# Patient Record
Sex: FEMALE | Race: WHITE | NOT HISPANIC OR LATINO
[De-identification: names, ages, dates, MRNs, and addresses within clinical notes are randomized per-mention and may not be internally consistent; named-entity substitution may affect disease eponyms.]

---

## 2023-09-07 ENCOUNTER — APPOINTMENT (OUTPATIENT)
Dept: INTERNAL MEDICINE | Facility: CLINIC | Age: 43
End: 2023-09-07

## 2023-10-24 PROBLEM — Z00.00 ENCOUNTER FOR PREVENTIVE HEALTH EXAMINATION: Status: ACTIVE | Noted: 2023-10-24

## 2023-10-26 ENCOUNTER — APPOINTMENT (OUTPATIENT)
Dept: OBGYN | Facility: CLINIC | Age: 43
End: 2023-10-26
Payer: COMMERCIAL

## 2023-10-26 VITALS
WEIGHT: 147 LBS | DIASTOLIC BLOOD PRESSURE: 82 MMHG | BODY MASS INDEX: 23.63 KG/M2 | SYSTOLIC BLOOD PRESSURE: 116 MMHG | HEIGHT: 66 IN

## 2023-10-26 DIAGNOSIS — Z80.3 FAMILY HISTORY OF MALIGNANT NEOPLASM OF BREAST: ICD-10-CM

## 2023-10-26 DIAGNOSIS — B37.9 CANDIDIASIS, UNSPECIFIED: ICD-10-CM

## 2023-10-26 DIAGNOSIS — Z01.419 ENCOUNTER FOR GYNECOLOGICAL EXAMINATION (GENERAL) (ROUTINE) W/OUT ABNORMAL FINDINGS: ICD-10-CM

## 2023-10-26 DIAGNOSIS — N97.9 FEMALE INFERTILITY, UNSPECIFIED: ICD-10-CM

## 2023-10-26 PROCEDURE — 99386 PREV VISIT NEW AGE 40-64: CPT

## 2023-10-27 LAB — HPV HIGH+LOW RISK DNA PNL CVX: NOT DETECTED

## 2023-10-28 ENCOUNTER — TRANSCRIPTION ENCOUNTER (OUTPATIENT)
Age: 43
End: 2023-10-28

## 2023-10-31 ENCOUNTER — TRANSCRIPTION ENCOUNTER (OUTPATIENT)
Age: 43
End: 2023-10-31

## 2023-11-01 LAB — CYTOLOGY CVX/VAG DOC THIN PREP: ABNORMAL

## 2023-11-02 ENCOUNTER — TRANSCRIPTION ENCOUNTER (OUTPATIENT)
Age: 43
End: 2023-11-02

## 2023-11-04 ENCOUNTER — RESULT REVIEW (OUTPATIENT)
Age: 43
End: 2023-11-04

## 2023-11-04 ENCOUNTER — OUTPATIENT (OUTPATIENT)
Dept: OUTPATIENT SERVICES | Facility: HOSPITAL | Age: 43
LOS: 1 days | End: 2023-11-04
Payer: COMMERCIAL

## 2023-11-04 ENCOUNTER — APPOINTMENT (OUTPATIENT)
Dept: MAMMOGRAPHY | Facility: IMAGING CENTER | Age: 43
End: 2023-11-04
Payer: COMMERCIAL

## 2023-11-04 DIAGNOSIS — Z01.419 ENCOUNTER FOR GYNECOLOGICAL EXAMINATION (GENERAL) (ROUTINE) WITHOUT ABNORMAL FINDINGS: ICD-10-CM

## 2023-11-04 PROCEDURE — 77063 BREAST TOMOSYNTHESIS BI: CPT | Mod: 26

## 2023-11-04 PROCEDURE — 77067 SCR MAMMO BI INCL CAD: CPT

## 2023-11-04 PROCEDURE — 77067 SCR MAMMO BI INCL CAD: CPT | Mod: 26

## 2023-11-04 PROCEDURE — 77063 BREAST TOMOSYNTHESIS BI: CPT

## 2024-01-02 ENCOUNTER — TRANSCRIPTION ENCOUNTER (OUTPATIENT)
Age: 44
End: 2024-01-02

## 2024-01-03 ENCOUNTER — APPOINTMENT (OUTPATIENT)
Dept: OBGYN | Facility: CLINIC | Age: 44
End: 2024-01-03
Payer: COMMERCIAL

## 2024-01-03 VITALS — SYSTOLIC BLOOD PRESSURE: 122 MMHG | BODY MASS INDEX: 24.05 KG/M2 | DIASTOLIC BLOOD PRESSURE: 84 MMHG | WEIGHT: 149 LBS

## 2024-01-03 DIAGNOSIS — N92.6 IRREGULAR MENSTRUATION, UNSPECIFIED: ICD-10-CM

## 2024-01-03 PROCEDURE — 99214 OFFICE O/P EST MOD 30 MIN: CPT | Mod: 25

## 2024-01-03 PROCEDURE — 76817 TRANSVAGINAL US OBSTETRIC: CPT

## 2024-01-03 NOTE — PROCEDURE
[Transvaginal OB Sonogram] : Transvaginal OB Sonogram [Transabdominal OB Sonogram] : Transabdominal OB Sonogram [Intrauterine Pregnancy] : intrauterine pregnancy [Yolk Sac] : yolk sac present [Fetal Heart] : fetal heart present [Date: ___] : Date: [unfilled] [Current GA by Sonogram: ___ (wks)] : Current GA by Sonogram: [unfilled]Uwks [___ day(s)] : [unfilled] days [FreeTextEntry1] : SIUP c/w 9+0, FHT 180s

## 2024-01-03 NOTE — HISTORY OF PRESENT ILLNESS
[FreeTextEntry1] : LMP 10/21  42yo  with amenorrhea and +UPT owuld be 10+4 by LMP. Here with  Renaldo today. Hx of infertility s/p IVF (IVF pregnancy last pregnancy) and hx of L ectopic s/p L salpingectomy and possible endometriosis. Was told other fallopian tube was blocked on HSG. Also hx FVL heterozygote dx during IVF workup, no personal hx clots. 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  POB: L ectopic s/p salpingectomy, FT   Adin 39 week IOL PROM 8#5 (IVF pregnancy, on lovenox thorughout pregnancy)

## 2024-01-03 NOTE — PLAN
[FreeTextEntry1] : Redated to 9wk by sonia today, VERA 8/7/24. Discussed FVL and given pt was on lovenox jhoana last pregnancy (no personal hx) recommend restarting lovenox. FOr MFM consult. S/p flu vax, discusesd covid vax. Discussed option of NIPT today and discussed option doing at later GA given risk of low fetal fraction, pt wishes to do NIPT today with PNL. Advised ASA 1-2 tabs daily. RTO 3 wk, USC given.

## 2024-01-04 LAB
ABO + RH PNL BLD: NORMAL
BASOPHILS # BLD AUTO: 0.07 K/UL
BASOPHILS NFR BLD AUTO: 0.9 %
BLD GP AB SCN SERPL QL: NORMAL
C TRACH RRNA SPEC QL NAA+PROBE: NOT DETECTED
EOSINOPHIL # BLD AUTO: 0.17 K/UL
EOSINOPHIL NFR BLD AUTO: 2.3 %
HBV SURFACE AG SER QL: NONREACTIVE
HCT VFR BLD CALC: 39.2 %
HCV AB SER QL: NONREACTIVE
HCV S/CO RATIO: 0.17 S/CO
HGB A MFR BLD: 97.4 %
HGB A2 MFR BLD: 2.6 %
HGB BLD-MCNC: 12.8 G/DL
HGB FRACT BLD-IMP: NORMAL
HIV1+2 AB SPEC QL IA.RAPID: NONREACTIVE
IMM GRANULOCYTES NFR BLD AUTO: 0.3 %
LYMPHOCYTES # BLD AUTO: 2.65 K/UL
LYMPHOCYTES NFR BLD AUTO: 36 %
MAN DIFF?: NORMAL
MCHC RBC-ENTMCNC: 29.8 PG
MCHC RBC-ENTMCNC: 32.7 GM/DL
MCV RBC AUTO: 91.4 FL
MEV IGG FLD QL IA: >300 AU/ML
MEV IGG+IGM SER-IMP: POSITIVE
MONOCYTES # BLD AUTO: 0.39 K/UL
MONOCYTES NFR BLD AUTO: 5.3 %
N GONORRHOEA RRNA SPEC QL NAA+PROBE: NOT DETECTED
NEUTROPHILS # BLD AUTO: 4.07 K/UL
NEUTROPHILS NFR BLD AUTO: 55.2 %
PLATELET # BLD AUTO: 275 K/UL
RBC # BLD: 4.29 M/UL
RBC # FLD: 13.7 %
RUBV IGG FLD-ACNC: 2.9 INDEX
RUBV IGG SER-IMP: POSITIVE
SOURCE AMPLIFICATION: NORMAL
T PALLIDUM AB SER QL IA: NEGATIVE
TSH SERPL-ACNC: 1.16 UIU/ML
VZV AB TITR SER: POSITIVE
VZV IGG SER IF-ACNC: 999.4 INDEX
WBC # FLD AUTO: 7.37 K/UL

## 2024-01-04 RX ORDER — ENOXAPARIN SODIUM 40 MG/.4ML
40 INJECTION, SOLUTION SUBCUTANEOUS DAILY
Qty: 4 | Refills: 9 | Status: ACTIVE | COMMUNITY
Start: 2024-01-03 | End: 1900-01-01

## 2024-01-05 LAB — BACTERIA UR CULT: NORMAL

## 2024-01-07 LAB
AR GENE MUT ANL BLD/T: NORMAL
LEAD BLD-MCNC: <1 UG/DL

## 2024-01-09 LAB — FMR1 GENE MUT ANL BLD/T: NORMAL

## 2024-01-10 LAB — CFTR MUT TESTED BLD/T: NEGATIVE

## 2024-01-22 ENCOUNTER — APPOINTMENT (OUTPATIENT)
Dept: OBGYN | Facility: CLINIC | Age: 44
End: 2024-01-22
Payer: COMMERCIAL

## 2024-01-22 ENCOUNTER — ASOB RESULT (OUTPATIENT)
Age: 44
End: 2024-01-22

## 2024-01-22 ENCOUNTER — APPOINTMENT (OUTPATIENT)
Dept: ANTEPARTUM | Facility: CLINIC | Age: 44
End: 2024-01-22
Payer: COMMERCIAL

## 2024-01-22 ENCOUNTER — NON-APPOINTMENT (OUTPATIENT)
Age: 44
End: 2024-01-22

## 2024-01-22 VITALS
BODY MASS INDEX: 23.87 KG/M2 | DIASTOLIC BLOOD PRESSURE: 74 MMHG | SYSTOLIC BLOOD PRESSURE: 110 MMHG | WEIGHT: 148.5 LBS | HEIGHT: 66 IN

## 2024-01-22 VITALS
WEIGHT: 143 LBS | SYSTOLIC BLOOD PRESSURE: 100 MMHG | BODY MASS INDEX: 22.98 KG/M2 | DIASTOLIC BLOOD PRESSURE: 60 MMHG | HEIGHT: 66 IN

## 2024-01-22 DIAGNOSIS — D68.51 ACTIVATED PROTEIN C RESISTANCE: ICD-10-CM

## 2024-01-22 DIAGNOSIS — Z34.91 ENCOUNTER FOR SUPERVISION OF NORMAL PREGNANCY, UNSPECIFIED, FIRST TRIMESTER: ICD-10-CM

## 2024-01-22 PROCEDURE — 76817 TRANSVAGINAL US OBSTETRIC: CPT

## 2024-01-22 PROCEDURE — 76813 OB US NUCHAL MEAS 1 GEST: CPT | Mod: 59

## 2024-01-22 PROCEDURE — 76801 OB US < 14 WKS SINGLE FETUS: CPT

## 2024-01-22 PROCEDURE — 0501F PRENATAL FLOW SHEET: CPT

## 2024-01-24 LAB — DNA PLOIDY SPEC FC-IMP: NORMAL

## 2024-02-15 ENCOUNTER — NON-APPOINTMENT (OUTPATIENT)
Age: 44
End: 2024-02-15

## 2024-02-15 ENCOUNTER — APPOINTMENT (OUTPATIENT)
Dept: OBGYN | Facility: CLINIC | Age: 44
End: 2024-02-15
Payer: COMMERCIAL

## 2024-02-15 VITALS — WEIGHT: 150 LBS | SYSTOLIC BLOOD PRESSURE: 120 MMHG | BODY MASS INDEX: 24.21 KG/M2 | DIASTOLIC BLOOD PRESSURE: 70 MMHG

## 2024-02-15 DIAGNOSIS — Z34.90 ENCOUNTER FOR SUPERVISION OF NORMAL PREGNANCY, UNSPECIFIED, UNSPECIFIED TRIMESTER: ICD-10-CM

## 2024-02-15 DIAGNOSIS — M54.50 LOW BACK PAIN, UNSPECIFIED: ICD-10-CM

## 2024-02-15 PROCEDURE — 99213 OFFICE O/P EST LOW 20 MIN: CPT | Mod: 25

## 2024-02-15 PROCEDURE — 76817 TRANSVAGINAL US OBSTETRIC: CPT

## 2024-02-15 RX ORDER — FLUCONAZOLE 150 MG/1
150 TABLET ORAL
Qty: 1 | Refills: 0 | Status: COMPLETED | COMMUNITY
Start: 2023-10-26 | End: 2024-02-15

## 2024-02-15 NOTE — REVIEW OF SYSTEMS
[Chest Pain] : no chest pain [Dyspnea] : no shortness of breath [Dysuria] : no dysuria [Abdominal Pain] : no abdominal pain [Pelvic Pain] : no pelvic pain [Abn Vag Bleeding] : no abnormal vaginal bleeding [Urgency] : no urgency [Nl] : Integumentary

## 2024-02-15 NOTE — PHYSICAL EXAM
[Chaperone Declined] : Patient declined chaperone [Normal] : uterus [No Bleeding] : there was no active vaginal bleeding [Tenderness] : nontender [FreeTextEntry5] : closed

## 2024-02-15 NOTE — PROCEDURE
[Transvaginal Ultrasound] : transvaginal ultrasound [FreeTextEntry3] : cervical length: 3.34 (wnl) see pic

## 2024-02-20 ENCOUNTER — TRANSCRIPTION ENCOUNTER (OUTPATIENT)
Age: 44
End: 2024-02-20

## 2024-02-20 ENCOUNTER — NON-APPOINTMENT (OUTPATIENT)
Age: 44
End: 2024-02-20

## 2024-02-21 ENCOUNTER — TRANSCRIPTION ENCOUNTER (OUTPATIENT)
Age: 44
End: 2024-02-21

## 2024-02-21 ENCOUNTER — APPOINTMENT (OUTPATIENT)
Dept: ANTEPARTUM | Facility: CLINIC | Age: 44
End: 2024-02-21

## 2024-02-23 ENCOUNTER — APPOINTMENT (OUTPATIENT)
Dept: OBGYN | Facility: CLINIC | Age: 44
End: 2024-02-23
Payer: COMMERCIAL

## 2024-02-23 PROCEDURE — 0502F SUBSEQUENT PRENATAL CARE: CPT

## 2024-02-27 LAB
AFP MOM: 1.08
AFP VALUE: 37.6 NG/ML
ALPHA FETOPROTEIN SERUM COMMENT: NORMAL
ALPHA FETOPROTEIN SERUM INTERPRETATION: NORMAL
ALPHA FETOPROTEIN SERUM RESULTS: NORMAL
ALPHA FETOPROTEIN SERUM TEST RESULTS: NORMAL
GESTATIONAL AGE BASED ON: NORMAL
GESTATIONAL AGE ON COLLECTION DATE: 16.3 WEEKS
INSULIN DEP DIABETES: NO
MATERNAL AGE AT EDD AFP: 45.6 YR
MULTIPLE GESTATION: NO
OSBR RISK 1 IN: 9540
RACE: NORMAL
WEIGHT AFP: 152 LBS

## 2024-02-29 ENCOUNTER — ASOB RESULT (OUTPATIENT)
Age: 44
End: 2024-02-29

## 2024-02-29 ENCOUNTER — APPOINTMENT (OUTPATIENT)
Dept: MATERNAL FETAL MEDICINE | Facility: CLINIC | Age: 44
End: 2024-02-29
Payer: COMMERCIAL

## 2024-02-29 ENCOUNTER — APPOINTMENT (OUTPATIENT)
Dept: ANTEPARTUM | Facility: CLINIC | Age: 44
End: 2024-02-29
Payer: COMMERCIAL

## 2024-02-29 VITALS
HEIGHT: 66 IN | HEART RATE: 97 BPM | BODY MASS INDEX: 25.09 KG/M2 | SYSTOLIC BLOOD PRESSURE: 120 MMHG | DIASTOLIC BLOOD PRESSURE: 67 MMHG | OXYGEN SATURATION: 98 % | WEIGHT: 156.13 LBS

## 2024-02-29 PROCEDURE — 76805 OB US >/= 14 WKS SNGL FETUS: CPT

## 2024-02-29 PROCEDURE — 76817 TRANSVAGINAL US OBSTETRIC: CPT

## 2024-02-29 PROCEDURE — 99205 OFFICE O/P NEW HI 60 MIN: CPT

## 2024-03-03 ENCOUNTER — NON-APPOINTMENT (OUTPATIENT)
Age: 44
End: 2024-03-03

## 2024-03-05 ENCOUNTER — ASOB RESULT (OUTPATIENT)
Age: 44
End: 2024-03-05

## 2024-03-05 ENCOUNTER — APPOINTMENT (OUTPATIENT)
Dept: MATERNAL FETAL MEDICINE | Facility: CLINIC | Age: 44
End: 2024-03-05
Payer: COMMERCIAL

## 2024-03-05 PROCEDURE — 99204 OFFICE O/P NEW MOD 45 MIN: CPT | Mod: 95

## 2024-03-05 PROCEDURE — 99214 OFFICE O/P EST MOD 30 MIN: CPT | Mod: 95

## 2024-03-11 ENCOUNTER — NON-APPOINTMENT (OUTPATIENT)
Age: 44
End: 2024-03-11

## 2024-03-19 ENCOUNTER — APPOINTMENT (OUTPATIENT)
Dept: OBGYN | Facility: CLINIC | Age: 44
End: 2024-03-19
Payer: COMMERCIAL

## 2024-03-19 VITALS
DIASTOLIC BLOOD PRESSURE: 72 MMHG | BODY MASS INDEX: 26.06 KG/M2 | WEIGHT: 162.13 LBS | HEIGHT: 66 IN | SYSTOLIC BLOOD PRESSURE: 106 MMHG

## 2024-03-19 DIAGNOSIS — N64.59 OTHER SIGNS AND SYMPTOMS IN BREAST: ICD-10-CM

## 2024-03-19 LAB
ESTIMATED AVERAGE GLUCOSE: 94 MG/DL
HBA1C MFR BLD HPLC: 4.9 %

## 2024-03-19 PROCEDURE — 0502F SUBSEQUENT PRENATAL CARE: CPT

## 2024-03-20 ENCOUNTER — ASOB RESULT (OUTPATIENT)
Age: 44
End: 2024-03-20

## 2024-03-20 ENCOUNTER — APPOINTMENT (OUTPATIENT)
Dept: ANTEPARTUM | Facility: CLINIC | Age: 44
End: 2024-03-20
Payer: COMMERCIAL

## 2024-03-20 DIAGNOSIS — O09.529 SUPERVISION OF ELDERLY MULTIGRAVIDA, UNSPECIFIED TRIMESTER: ICD-10-CM

## 2024-03-20 LAB
ALBUMIN SERPL ELPH-MCNC: 3.7 G/DL
ALP BLD-CCNC: 61 U/L
ALT SERPL-CCNC: 23 U/L
ANION GAP SERPL CALC-SCNC: 12 MMOL/L
AST SERPL-CCNC: 17 U/L
BILIRUB SERPL-MCNC: 0.2 MG/DL
BUN SERPL-MCNC: 7 MG/DL
CALCIUM SERPL-MCNC: 9.7 MG/DL
CHLORIDE SERPL-SCNC: 106 MMOL/L
CO2 SERPL-SCNC: 21 MMOL/L
CREAT SERPL-MCNC: 0.47 MG/DL
EGFR: 120 ML/MIN/1.73M2
GLUCOSE SERPL-MCNC: 84 MG/DL
POTASSIUM SERPL-SCNC: 5.2 MMOL/L
PROT SERPL-MCNC: 6.5 G/DL
SODIUM SERPL-SCNC: 139 MMOL/L

## 2024-03-20 PROCEDURE — 76817 TRANSVAGINAL US OBSTETRIC: CPT | Mod: 59

## 2024-03-20 PROCEDURE — 76811 OB US DETAILED SNGL FETUS: CPT

## 2024-03-21 ENCOUNTER — TRANSCRIPTION ENCOUNTER (OUTPATIENT)
Age: 44
End: 2024-03-21

## 2024-03-27 ENCOUNTER — APPOINTMENT (OUTPATIENT)
Dept: PEDIATRIC CARDIOLOGY | Facility: CLINIC | Age: 44
End: 2024-03-27
Payer: COMMERCIAL

## 2024-03-27 PROCEDURE — 93325 DOPPLER ECHO COLOR FLOW MAPG: CPT

## 2024-03-27 PROCEDURE — 99202 OFFICE O/P NEW SF 15 MIN: CPT | Mod: 25

## 2024-03-27 PROCEDURE — 76825 ECHO EXAM OF FETAL HEART: CPT

## 2024-03-27 PROCEDURE — 76827 ECHO EXAM OF FETAL HEART: CPT

## 2024-04-04 ENCOUNTER — APPOINTMENT (OUTPATIENT)
Dept: ANTEPARTUM | Facility: CLINIC | Age: 44
End: 2024-04-04

## 2024-04-11 ENCOUNTER — ASOB RESULT (OUTPATIENT)
Age: 44
End: 2024-04-11

## 2024-04-11 ENCOUNTER — APPOINTMENT (OUTPATIENT)
Dept: ANTEPARTUM | Facility: CLINIC | Age: 44
End: 2024-04-11
Payer: COMMERCIAL

## 2024-04-11 PROCEDURE — 76816 OB US FOLLOW-UP PER FETUS: CPT

## 2024-04-11 PROCEDURE — 76817 TRANSVAGINAL US OBSTETRIC: CPT

## 2024-04-12 ENCOUNTER — NON-APPOINTMENT (OUTPATIENT)
Age: 44
End: 2024-04-12

## 2024-04-17 ENCOUNTER — APPOINTMENT (OUTPATIENT)
Dept: OBGYN | Facility: CLINIC | Age: 44
End: 2024-04-17
Payer: COMMERCIAL

## 2024-04-17 VITALS
WEIGHT: 168 LBS | SYSTOLIC BLOOD PRESSURE: 97 MMHG | DIASTOLIC BLOOD PRESSURE: 65 MMHG | BODY MASS INDEX: 27 KG/M2 | HEIGHT: 66 IN

## 2024-04-17 DIAGNOSIS — Z34.92 ENCOUNTER FOR SUPERVISION OF NORMAL PREGNANCY, UNSPECIFIED, SECOND TRIMESTER: ICD-10-CM

## 2024-04-17 PROCEDURE — 0502F SUBSEQUENT PRENATAL CARE: CPT

## 2024-04-18 ENCOUNTER — TRANSCRIPTION ENCOUNTER (OUTPATIENT)
Age: 44
End: 2024-04-18

## 2024-04-18 LAB
BASOPHILS # BLD AUTO: 0.07 K/UL
BASOPHILS NFR BLD AUTO: 0.8 %
EOSINOPHIL # BLD AUTO: 0.18 K/UL
EOSINOPHIL NFR BLD AUTO: 2 %
GLUCOSE 1H P 50 G GLC PO SERPL-MCNC: 93 MG/DL
HCT VFR BLD CALC: 33.9 %
HGB BLD-MCNC: 10.8 G/DL
HIV1+2 AB SPEC QL IA.RAPID: NONREACTIVE
IMM GRANULOCYTES NFR BLD AUTO: 0.4 %
LYMPHOCYTES # BLD AUTO: 2.49 K/UL
LYMPHOCYTES NFR BLD AUTO: 27.6 %
MAN DIFF?: NORMAL
MCHC RBC-ENTMCNC: 29.7 PG
MCHC RBC-ENTMCNC: 31.9 GM/DL
MCV RBC AUTO: 93.1 FL
MONOCYTES # BLD AUTO: 0.49 K/UL
MONOCYTES NFR BLD AUTO: 5.4 %
NEUTROPHILS # BLD AUTO: 5.76 K/UL
NEUTROPHILS NFR BLD AUTO: 63.8 %
PLATELET # BLD AUTO: 306 K/UL
RBC # BLD: 3.64 M/UL
RBC # FLD: 14.6 %
T PALLIDUM AB SER QL IA: NEGATIVE
WBC # FLD AUTO: 9.03 K/UL

## 2024-04-25 ENCOUNTER — APPOINTMENT (OUTPATIENT)
Dept: ANTEPARTUM | Facility: CLINIC | Age: 44
End: 2024-04-25
Payer: COMMERCIAL

## 2024-04-25 ENCOUNTER — ASOB RESULT (OUTPATIENT)
Age: 44
End: 2024-04-25

## 2024-04-25 PROCEDURE — 76816 OB US FOLLOW-UP PER FETUS: CPT

## 2024-05-06 ENCOUNTER — NON-APPOINTMENT (OUTPATIENT)
Age: 44
End: 2024-05-06

## 2024-05-09 ENCOUNTER — APPOINTMENT (OUTPATIENT)
Dept: ANTEPARTUM | Facility: CLINIC | Age: 44
End: 2024-05-09
Payer: COMMERCIAL

## 2024-05-09 ENCOUNTER — ASOB RESULT (OUTPATIENT)
Age: 44
End: 2024-05-09

## 2024-05-09 PROCEDURE — 76816 OB US FOLLOW-UP PER FETUS: CPT

## 2024-05-13 ENCOUNTER — NON-APPOINTMENT (OUTPATIENT)
Age: 44
End: 2024-05-13

## 2024-05-15 ENCOUNTER — APPOINTMENT (OUTPATIENT)
Dept: OBGYN | Facility: CLINIC | Age: 44
End: 2024-05-15
Payer: COMMERCIAL

## 2024-05-15 VITALS
BODY MASS INDEX: 27.48 KG/M2 | WEIGHT: 171 LBS | SYSTOLIC BLOOD PRESSURE: 98 MMHG | HEIGHT: 66 IN | DIASTOLIC BLOOD PRESSURE: 62 MMHG

## 2024-05-15 DIAGNOSIS — Z34.93 ENCOUNTER FOR SUPERVISION OF NORMAL PREGNANCY, UNSPECIFIED, THIRD TRIMESTER: ICD-10-CM

## 2024-05-15 DIAGNOSIS — D64.9 ANEMIA, UNSPECIFIED: ICD-10-CM

## 2024-05-15 PROCEDURE — 0502F SUBSEQUENT PRENATAL CARE: CPT

## 2024-05-15 RX ORDER — FERROUS SULFATE 137(45) MG
45 TABLET, EXTENDED RELEASE ORAL DAILY
Qty: 90 | Refills: 2 | Status: ACTIVE | COMMUNITY
Start: 2024-05-15 | End: 1900-01-01

## 2024-05-20 ENCOUNTER — NON-APPOINTMENT (OUTPATIENT)
Age: 44
End: 2024-05-20

## 2024-05-23 ENCOUNTER — ASOB RESULT (OUTPATIENT)
Age: 44
End: 2024-05-23

## 2024-05-23 ENCOUNTER — APPOINTMENT (OUTPATIENT)
Dept: ANTEPARTUM | Facility: CLINIC | Age: 44
End: 2024-05-23
Payer: COMMERCIAL

## 2024-05-23 PROCEDURE — 76816 OB US FOLLOW-UP PER FETUS: CPT

## 2024-05-30 ENCOUNTER — NON-APPOINTMENT (OUTPATIENT)
Age: 44
End: 2024-05-30

## 2024-05-30 ENCOUNTER — APPOINTMENT (OUTPATIENT)
Dept: OBGYN | Facility: CLINIC | Age: 44
End: 2024-05-30
Payer: COMMERCIAL

## 2024-05-30 VITALS — WEIGHT: 175 LBS | DIASTOLIC BLOOD PRESSURE: 62 MMHG | SYSTOLIC BLOOD PRESSURE: 94 MMHG | BODY MASS INDEX: 28.25 KG/M2

## 2024-05-30 PROCEDURE — 0502F SUBSEQUENT PRENATAL CARE: CPT

## 2024-06-05 ENCOUNTER — APPOINTMENT (OUTPATIENT)
Dept: ANTEPARTUM | Facility: CLINIC | Age: 44
End: 2024-06-05
Payer: COMMERCIAL

## 2024-06-05 ENCOUNTER — ASOB RESULT (OUTPATIENT)
Age: 44
End: 2024-06-05

## 2024-06-05 PROCEDURE — 76819 FETAL BIOPHYS PROFIL W/O NST: CPT | Mod: 59

## 2024-06-05 PROCEDURE — 76816 OB US FOLLOW-UP PER FETUS: CPT

## 2024-06-13 ENCOUNTER — NON-APPOINTMENT (OUTPATIENT)
Age: 44
End: 2024-06-13

## 2024-06-13 ENCOUNTER — APPOINTMENT (OUTPATIENT)
Dept: OBGYN | Facility: CLINIC | Age: 44
End: 2024-06-13
Payer: COMMERCIAL

## 2024-06-13 VITALS
DIASTOLIC BLOOD PRESSURE: 74 MMHG | WEIGHT: 178.25 LBS | BODY MASS INDEX: 28.65 KG/M2 | HEIGHT: 66 IN | SYSTOLIC BLOOD PRESSURE: 109 MMHG

## 2024-06-13 PROCEDURE — 81003 URINALYSIS AUTO W/O SCOPE: CPT | Mod: NC,QW

## 2024-06-13 PROCEDURE — 0502F SUBSEQUENT PRENATAL CARE: CPT

## 2024-06-18 ENCOUNTER — TRANSCRIPTION ENCOUNTER (OUTPATIENT)
Age: 44
End: 2024-06-18

## 2024-06-27 ENCOUNTER — NON-APPOINTMENT (OUTPATIENT)
Age: 44
End: 2024-06-27

## 2024-06-27 ENCOUNTER — ASOB RESULT (OUTPATIENT)
Age: 44
End: 2024-06-27

## 2024-06-27 ENCOUNTER — APPOINTMENT (OUTPATIENT)
Dept: ANTEPARTUM | Facility: CLINIC | Age: 44
End: 2024-06-27

## 2024-06-27 ENCOUNTER — APPOINTMENT (OUTPATIENT)
Dept: OBGYN | Facility: CLINIC | Age: 44
End: 2024-06-27
Payer: COMMERCIAL

## 2024-06-27 VITALS
BODY MASS INDEX: 28.51 KG/M2 | DIASTOLIC BLOOD PRESSURE: 70 MMHG | HEIGHT: 66 IN | SYSTOLIC BLOOD PRESSURE: 112 MMHG | WEIGHT: 177.38 LBS

## 2024-06-27 PROCEDURE — 0502F SUBSEQUENT PRENATAL CARE: CPT

## 2024-06-27 PROCEDURE — 76816 OB US FOLLOW-UP PER FETUS: CPT

## 2024-07-03 ENCOUNTER — APPOINTMENT (OUTPATIENT)
Dept: ANTEPARTUM | Facility: CLINIC | Age: 44
End: 2024-07-03

## 2024-07-09 ENCOUNTER — APPOINTMENT (OUTPATIENT)
Dept: ANTEPARTUM | Facility: CLINIC | Age: 44
End: 2024-07-09
Payer: COMMERCIAL

## 2024-07-09 ENCOUNTER — ASOB RESULT (OUTPATIENT)
Age: 44
End: 2024-07-09

## 2024-07-09 ENCOUNTER — APPOINTMENT (OUTPATIENT)
Dept: OBGYN | Facility: CLINIC | Age: 44
End: 2024-07-09
Payer: COMMERCIAL

## 2024-07-09 VITALS
BODY MASS INDEX: 29.25 KG/M2 | DIASTOLIC BLOOD PRESSURE: 76 MMHG | SYSTOLIC BLOOD PRESSURE: 117 MMHG | WEIGHT: 181.25 LBS

## 2024-07-09 PROCEDURE — 76818 FETAL BIOPHYS PROFILE W/NST: CPT

## 2024-07-09 PROCEDURE — 0502F SUBSEQUENT PRENATAL CARE: CPT

## 2024-07-09 PROCEDURE — 81003 URINALYSIS AUTO W/O SCOPE: CPT | Mod: NC,QW

## 2024-07-11 ENCOUNTER — APPOINTMENT (OUTPATIENT)
Dept: OBGYN | Facility: CLINIC | Age: 44
End: 2024-07-11

## 2024-07-11 ENCOUNTER — NON-APPOINTMENT (OUTPATIENT)
Age: 44
End: 2024-07-11

## 2024-07-18 ENCOUNTER — APPOINTMENT (OUTPATIENT)
Dept: OBGYN | Facility: CLINIC | Age: 44
End: 2024-07-18
Payer: COMMERCIAL

## 2024-07-18 ENCOUNTER — APPOINTMENT (OUTPATIENT)
Dept: ANTEPARTUM | Facility: CLINIC | Age: 44
End: 2024-07-18

## 2024-07-18 VITALS
DIASTOLIC BLOOD PRESSURE: 77 MMHG | SYSTOLIC BLOOD PRESSURE: 115 MMHG | BODY MASS INDEX: 29.81 KG/M2 | WEIGHT: 185.5 LBS | HEIGHT: 66 IN

## 2024-07-18 VITALS
BODY MASS INDEX: 29.81 KG/M2 | DIASTOLIC BLOOD PRESSURE: 77 MMHG | HEIGHT: 66 IN | SYSTOLIC BLOOD PRESSURE: 115 MMHG | WEIGHT: 185.5 LBS

## 2024-07-18 DIAGNOSIS — Z34.91 ENCOUNTER FOR SUPERVISION OF NORMAL PREGNANCY, UNSPECIFIED, FIRST TRIMESTER: ICD-10-CM

## 2024-07-18 DIAGNOSIS — Z34.92 ENCOUNTER FOR SUPERVISION OF NORMAL PREGNANCY, UNSPECIFIED, SECOND TRIMESTER: ICD-10-CM

## 2024-07-18 PROCEDURE — 0502F SUBSEQUENT PRENATAL CARE: CPT

## 2024-07-19 ENCOUNTER — APPOINTMENT (OUTPATIENT)
Dept: ANTEPARTUM | Facility: CLINIC | Age: 44
End: 2024-07-19

## 2024-07-19 ENCOUNTER — APPOINTMENT (OUTPATIENT)
Dept: ANTEPARTUM | Facility: CLINIC | Age: 44
End: 2024-07-19
Payer: COMMERCIAL

## 2024-07-19 ENCOUNTER — ASOB RESULT (OUTPATIENT)
Age: 44
End: 2024-07-19

## 2024-07-19 PROCEDURE — 76818 FETAL BIOPHYS PROFILE W/NST: CPT

## 2024-07-22 ENCOUNTER — NON-APPOINTMENT (OUTPATIENT)
Age: 44
End: 2024-07-22

## 2024-07-22 LAB
GP B STREP DNA SPEC QL NAA+PROBE: NOT DETECTED
SOURCE GBS: NORMAL

## 2024-07-25 ENCOUNTER — ASOB RESULT (OUTPATIENT)
Age: 44
End: 2024-07-25

## 2024-07-25 ENCOUNTER — APPOINTMENT (OUTPATIENT)
Dept: OBGYN | Facility: CLINIC | Age: 44
End: 2024-07-25
Payer: COMMERCIAL

## 2024-07-25 ENCOUNTER — APPOINTMENT (OUTPATIENT)
Dept: ANTEPARTUM | Facility: CLINIC | Age: 44
End: 2024-07-25
Payer: COMMERCIAL

## 2024-07-25 VITALS — BODY MASS INDEX: 30.34 KG/M2 | WEIGHT: 188 LBS | DIASTOLIC BLOOD PRESSURE: 77 MMHG | SYSTOLIC BLOOD PRESSURE: 110 MMHG

## 2024-07-25 PROCEDURE — 76816 OB US FOLLOW-UP PER FETUS: CPT

## 2024-07-25 PROCEDURE — 76818 FETAL BIOPHYS PROFILE W/NST: CPT | Mod: 59

## 2024-07-25 PROCEDURE — 0502F SUBSEQUENT PRENATAL CARE: CPT

## 2024-07-29 ENCOUNTER — NON-APPOINTMENT (OUTPATIENT)
Age: 44
End: 2024-07-29

## 2024-07-30 ENCOUNTER — APPOINTMENT (OUTPATIENT)
Dept: ANTEPARTUM | Facility: CLINIC | Age: 44
End: 2024-07-30
Payer: COMMERCIAL

## 2024-07-30 ENCOUNTER — ASOB RESULT (OUTPATIENT)
Age: 44
End: 2024-07-30

## 2024-07-30 PROCEDURE — 76818 FETAL BIOPHYS PROFILE W/NST: CPT

## 2024-07-31 ENCOUNTER — NON-APPOINTMENT (OUTPATIENT)
Age: 44
End: 2024-07-31

## 2024-07-31 ENCOUNTER — APPOINTMENT (OUTPATIENT)
Dept: OBGYN | Facility: CLINIC | Age: 44
End: 2024-07-31
Payer: COMMERCIAL

## 2024-07-31 VITALS
BODY MASS INDEX: 29.27 KG/M2 | DIASTOLIC BLOOD PRESSURE: 80 MMHG | HEIGHT: 66 IN | WEIGHT: 182.13 LBS | SYSTOLIC BLOOD PRESSURE: 125 MMHG

## 2024-07-31 DIAGNOSIS — Z34.93 ENCOUNTER FOR SUPERVISION OF NORMAL PREGNANCY, UNSPECIFIED, THIRD TRIMESTER: ICD-10-CM

## 2024-07-31 PROCEDURE — 0502F SUBSEQUENT PRENATAL CARE: CPT

## 2024-08-03 ENCOUNTER — INPATIENT (INPATIENT)
Facility: HOSPITAL | Age: 44
LOS: 1 days | Discharge: HOME | DRG: 951 | End: 2024-08-05
Attending: OBSTETRICS & GYNECOLOGY | Admitting: OBSTETRICS & GYNECOLOGY
Payer: COMMERCIAL

## 2024-08-03 ENCOUNTER — OUTPATIENT (OUTPATIENT)
Dept: OUTPATIENT SERVICES | Facility: HOSPITAL | Age: 44
LOS: 1 days | End: 2024-08-03
Payer: COMMERCIAL

## 2024-08-03 VITALS — SYSTOLIC BLOOD PRESSURE: 118 MMHG | HEART RATE: 112 BPM | DIASTOLIC BLOOD PRESSURE: 58 MMHG

## 2024-08-03 VITALS — HEART RATE: 115 BPM | SYSTOLIC BLOOD PRESSURE: 108 MMHG | OXYGEN SATURATION: 99 % | DIASTOLIC BLOOD PRESSURE: 62 MMHG

## 2024-08-03 VITALS — DIASTOLIC BLOOD PRESSURE: 59 MMHG | HEART RATE: 130 BPM | OXYGEN SATURATION: 99 % | SYSTOLIC BLOOD PRESSURE: 113 MMHG

## 2024-08-03 DIAGNOSIS — O26.899 OTHER SPECIFIED PREGNANCY RELATED CONDITIONS, UNSPECIFIED TRIMESTER: ICD-10-CM

## 2024-08-03 DIAGNOSIS — Z34.80 ENCOUNTER FOR SUPERVISION OF OTHER NORMAL PREGNANCY, UNSPECIFIED TRIMESTER: ICD-10-CM

## 2024-08-03 LAB
ALBUMIN SERPL ELPH-MCNC: 3.7 G/DL — SIGNIFICANT CHANGE UP (ref 3.3–5)
ALP SERPL-CCNC: 234 U/L — HIGH (ref 40–120)
ALT FLD-CCNC: 23 U/L — SIGNIFICANT CHANGE UP (ref 10–45)
ANION GAP SERPL CALC-SCNC: 17 MMOL/L — SIGNIFICANT CHANGE UP (ref 5–17)
AST SERPL-CCNC: 29 U/L — SIGNIFICANT CHANGE UP (ref 10–40)
BASOPHILS # BLD AUTO: 0.03 K/UL — SIGNIFICANT CHANGE UP (ref 0–0.2)
BASOPHILS NFR BLD AUTO: 0.4 % — SIGNIFICANT CHANGE UP (ref 0–2)
BILIRUB SERPL-MCNC: 0.3 MG/DL — SIGNIFICANT CHANGE UP (ref 0.2–1.2)
BLD GP AB SCN SERPL QL: NEGATIVE — SIGNIFICANT CHANGE UP
BUN SERPL-MCNC: 7 MG/DL — SIGNIFICANT CHANGE UP (ref 7–23)
CALCIUM SERPL-MCNC: 9.5 MG/DL — SIGNIFICANT CHANGE UP (ref 8.4–10.5)
CHLORIDE SERPL-SCNC: 103 MMOL/L — SIGNIFICANT CHANGE UP (ref 96–108)
CO2 SERPL-SCNC: 19 MMOL/L — LOW (ref 22–31)
CREAT SERPL-MCNC: 0.53 MG/DL — SIGNIFICANT CHANGE UP (ref 0.5–1.3)
EGFR: 116 ML/MIN/1.73M2 — SIGNIFICANT CHANGE UP
EOSINOPHIL # BLD AUTO: 0.12 K/UL — SIGNIFICANT CHANGE UP (ref 0–0.5)
EOSINOPHIL NFR BLD AUTO: 1.7 % — SIGNIFICANT CHANGE UP (ref 0–6)
FLUAV AG NPH QL: SIGNIFICANT CHANGE UP
FLUBV AG NPH QL: SIGNIFICANT CHANGE UP
GLUCOSE SERPL-MCNC: 92 MG/DL — SIGNIFICANT CHANGE UP (ref 70–99)
HCT VFR BLD CALC: 29.4 % — LOW (ref 34.5–45)
HCT VFR BLD CALC: 34.5 % — SIGNIFICANT CHANGE UP (ref 34.5–45)
HGB BLD-MCNC: 11.2 G/DL — LOW (ref 11.5–15.5)
HGB BLD-MCNC: 9.8 G/DL — LOW (ref 11.5–15.5)
IMM GRANULOCYTES NFR BLD AUTO: 0.8 % — SIGNIFICANT CHANGE UP (ref 0–0.9)
LYMPHOCYTES # BLD AUTO: 1.15 K/UL — SIGNIFICANT CHANGE UP (ref 1–3.3)
LYMPHOCYTES # BLD AUTO: 15.9 % — SIGNIFICANT CHANGE UP (ref 13–44)
MCHC RBC-ENTMCNC: 29.6 PG — SIGNIFICANT CHANGE UP (ref 27–34)
MCHC RBC-ENTMCNC: 30.2 PG — SIGNIFICANT CHANGE UP (ref 27–34)
MCHC RBC-ENTMCNC: 32.5 GM/DL — SIGNIFICANT CHANGE UP (ref 32–36)
MCHC RBC-ENTMCNC: 33.3 GM/DL — SIGNIFICANT CHANGE UP (ref 32–36)
MCV RBC AUTO: 90.7 FL — SIGNIFICANT CHANGE UP (ref 80–100)
MCV RBC AUTO: 91.3 FL — SIGNIFICANT CHANGE UP (ref 80–100)
MONOCYTES # BLD AUTO: 0.69 K/UL — SIGNIFICANT CHANGE UP (ref 0–0.9)
MONOCYTES NFR BLD AUTO: 9.5 % — SIGNIFICANT CHANGE UP (ref 2–14)
NEUTROPHILS # BLD AUTO: 5.18 K/UL — SIGNIFICANT CHANGE UP (ref 1.8–7.4)
NEUTROPHILS NFR BLD AUTO: 71.7 % — SIGNIFICANT CHANGE UP (ref 43–77)
NRBC # BLD: 0 /100 WBCS — SIGNIFICANT CHANGE UP (ref 0–0)
NRBC # BLD: 0 /100 WBCS — SIGNIFICANT CHANGE UP (ref 0–0)
PLATELET # BLD AUTO: 138 K/UL — LOW (ref 150–400)
PLATELET # BLD AUTO: 157 K/UL — SIGNIFICANT CHANGE UP (ref 150–400)
POTASSIUM SERPL-MCNC: 3.9 MMOL/L — SIGNIFICANT CHANGE UP (ref 3.5–5.3)
POTASSIUM SERPL-SCNC: 3.9 MMOL/L — SIGNIFICANT CHANGE UP (ref 3.5–5.3)
PROT SERPL-MCNC: 6.9 G/DL — SIGNIFICANT CHANGE UP (ref 6–8.3)
RBC # BLD: 3.24 M/UL — LOW (ref 3.8–5.2)
RBC # BLD: 3.78 M/UL — LOW (ref 3.8–5.2)
RBC # FLD: 15.1 % — HIGH (ref 10.3–14.5)
RBC # FLD: 15.2 % — HIGH (ref 10.3–14.5)
RH IG SCN BLD-IMP: POSITIVE — SIGNIFICANT CHANGE UP
RSV RNA NPH QL NAA+NON-PROBE: SIGNIFICANT CHANGE UP
SARS-COV-2 RNA SPEC QL NAA+PROBE: DETECTED
SODIUM SERPL-SCNC: 139 MMOL/L — SIGNIFICANT CHANGE UP (ref 135–145)
WBC # BLD: 7.23 K/UL — SIGNIFICANT CHANGE UP (ref 3.8–10.5)
WBC # BLD: 8.94 K/UL — SIGNIFICANT CHANGE UP (ref 3.8–10.5)
WBC # FLD AUTO: 7.23 K/UL — SIGNIFICANT CHANGE UP (ref 3.8–10.5)
WBC # FLD AUTO: 8.94 K/UL — SIGNIFICANT CHANGE UP (ref 3.8–10.5)

## 2024-08-03 PROCEDURE — 85027 COMPLETE CBC AUTOMATED: CPT

## 2024-08-03 PROCEDURE — G0463: CPT

## 2024-08-03 PROCEDURE — 36415 COLL VENOUS BLD VENIPUNCTURE: CPT

## 2024-08-03 PROCEDURE — 80053 COMPREHEN METABOLIC PANEL: CPT

## 2024-08-03 PROCEDURE — 96361 HYDRATE IV INFUSION ADD-ON: CPT

## 2024-08-03 PROCEDURE — 87637 SARSCOV2&INF A&B&RSV AMP PRB: CPT

## 2024-08-03 PROCEDURE — 96374 THER/PROPH/DIAG INJ IV PUSH: CPT

## 2024-08-03 RX ORDER — OXYTOCIN/RINGER'S LACTATE 20/1000 ML
333.33 PLASTIC BAG, INJECTION (ML) INTRAVENOUS
Qty: 20 | Refills: 0 | Status: DISCONTINUED | OUTPATIENT
Start: 2024-08-03 | End: 2024-08-05

## 2024-08-03 RX ORDER — DEXTROSE MONOHYDRATE, SODIUM CHLORIDE, SODIUM LACTATE, CALCIUM CHLORIDE, MAGNESIUM CHLORIDE 1.5; 538; 448; 18.4; 5.08 G/100ML; MG/100ML; MG/100ML; MG/100ML; MG/100ML
1000 SOLUTION INTRAPERITONEAL
Refills: 0 | Status: ACTIVE | OUTPATIENT
Start: 2024-08-03 | End: 2025-07-02

## 2024-08-03 RX ORDER — ACETAMINOPHEN 500 MG
1000 TABLET ORAL ONCE
Refills: 0 | Status: COMPLETED | OUTPATIENT
Start: 2024-08-03 | End: 2024-08-03

## 2024-08-03 RX ORDER — DEXTROSE MONOHYDRATE, SODIUM CHLORIDE, SODIUM LACTATE, CALCIUM CHLORIDE, MAGNESIUM CHLORIDE 1.5; 538; 448; 18.4; 5.08 G/100ML; MG/100ML; MG/100ML; MG/100ML; MG/100ML
1000 SOLUTION INTRAPERITONEAL ONCE
Refills: 0 | Status: COMPLETED | OUTPATIENT
Start: 2024-08-03 | End: 2024-08-03

## 2024-08-03 RX ORDER — NIRMATRELVIR AND RITONAVIR 300-100 MG
1 KIT ORAL
Qty: 1 | Refills: 0
Start: 2024-08-03 | End: 2024-08-07

## 2024-08-03 RX ORDER — DEXTROSE MONOHYDRATE, SODIUM CHLORIDE, SODIUM LACTATE, CALCIUM CHLORIDE, MAGNESIUM CHLORIDE 1.5; 538; 448; 18.4; 5.08 G/100ML; MG/100ML; MG/100ML; MG/100ML; MG/100ML
1000 SOLUTION INTRAPERITONEAL
Refills: 0 | Status: DISCONTINUED | OUTPATIENT
Start: 2024-08-03 | End: 2024-08-04

## 2024-08-03 RX ORDER — REMDESIVIR 100 MG/1
200 INJECTION, POWDER, LYOPHILIZED, FOR SOLUTION INTRAVENOUS EVERY 24 HOURS
Refills: 0 | Status: COMPLETED | OUTPATIENT
Start: 2024-08-03 | End: 2024-08-04

## 2024-08-03 RX ORDER — DEXTROSE MONOHYDRATE, SODIUM CHLORIDE, SODIUM LACTATE, CALCIUM CHLORIDE, MAGNESIUM CHLORIDE 1.5; 538; 448; 18.4; 5.08 G/100ML; MG/100ML; MG/100ML; MG/100ML; MG/100ML
1000 SOLUTION INTRAPERITONEAL ONCE
Refills: 0 | Status: DISCONTINUED | OUTPATIENT
Start: 2024-08-03 | End: 2024-08-03

## 2024-08-03 RX ORDER — CHLORHEXIDINE GLUCONATE 500 MG/1
1 CLOTH TOPICAL DAILY
Refills: 0 | Status: DISCONTINUED | OUTPATIENT
Start: 2024-08-03 | End: 2024-08-04

## 2024-08-03 RX ORDER — REMDESIVIR 100 MG/1
INJECTION, POWDER, LYOPHILIZED, FOR SOLUTION INTRAVENOUS
Refills: 0 | Status: DISCONTINUED | OUTPATIENT
Start: 2024-08-03 | End: 2024-08-05

## 2024-08-03 RX ORDER — TRISODIUM CITRATE DIHYDRATE AND CITRIC ACID MONOHYDRATE 500; 334 MG/5ML; MG/5ML
15 SOLUTION ORAL EVERY 6 HOURS
Refills: 0 | Status: DISCONTINUED | OUTPATIENT
Start: 2024-08-03 | End: 2024-08-04

## 2024-08-03 RX ADMIN — DEXTROSE MONOHYDRATE, SODIUM CHLORIDE, SODIUM LACTATE, CALCIUM CHLORIDE, MAGNESIUM CHLORIDE 125 MILLILITER(S): 1.5; 538; 448; 18.4; 5.08 SOLUTION INTRAPERITONEAL at 22:22

## 2024-08-03 RX ADMIN — Medication 400 MILLIGRAM(S): at 10:17

## 2024-08-03 RX ADMIN — DEXTROSE MONOHYDRATE, SODIUM CHLORIDE, SODIUM LACTATE, CALCIUM CHLORIDE, MAGNESIUM CHLORIDE 125 MILLILITER(S): 1.5; 538; 448; 18.4; 5.08 SOLUTION INTRAPERITONEAL at 10:17

## 2024-08-03 RX ADMIN — DEXTROSE MONOHYDRATE, SODIUM CHLORIDE, SODIUM LACTATE, CALCIUM CHLORIDE, MAGNESIUM CHLORIDE 1000 MILLILITER(S): 1.5; 538; 448; 18.4; 5.08 SOLUTION INTRAPERITONEAL at 10:17

## 2024-08-03 NOTE — OB RN PATIENT PROFILE - NSICDXPASTMEDICALHX_GEN_ALL_CORE_FT
PAST MEDICAL HISTORY:  Miscarriage of left tubal ectopic pregnancy      (normal spontaneous vaginal delivery)      PAST MEDICAL HISTORY:  History of abnormal Pap smear     Miscarriage of left tubal ectopic pregnancy      (normal spontaneous vaginal delivery)

## 2024-08-03 NOTE — PRE-ANESTHESIA EVALUATION ADULT - NSANTHOSAYNRD_GEN_A_CORE
No. VANIA screening performed.  STOP BANG Legend: 0-2 = LOW Risk; 3-4 = INTERMEDIATE Risk; 5-8 = HIGH Risk

## 2024-08-03 NOTE — OB PROVIDER TRIAGE NOTE - ATTENDING COMMENTS
43yoF  @39+4wk  present w URI found to have + Covid  s/p IV hydration and Tylenol w improved symptoms  Cat 1FHR and BPP /8    discharge home  consider IOL once viral symptoms reduce  supportive care    indication to RTO or L&D   FM monitoring  Paxlovid to treat    Pt desire home and to continue w planned delivery    EUN Lopez MD  attending

## 2024-08-03 NOTE — OB RN TRIAGE NOTE - NSICDXPASTMEDICALHX_GEN_ALL_CORE_FT
PAST MEDICAL HISTORY:  Miscarriage of left tubal ectopic pregnancy      (normal spontaneous vaginal delivery)

## 2024-08-03 NOTE — OB PROVIDER H&P - HISTORY OF PRESENT ILLNESS
HPI: 43yoF  @39+4wga presents with vagina bleeding. Pt was initially presented today with fevers and chills and found to be covid +. Pt reports that she was having some brownish discharge when she went home but two hours ago she noticed some bright red bleeding on her pad and when she wiped. She denies any passage of clots and her pad was never fully saturated. She denies any leakage of fluid. Denies CP/SOB/lightheadedness/dizziness/nausea/vomiting/diarrhea. She has increased back cramping. She reports + fetal movement.    – PNC: Follows with MFM for Factor V Leiden (hetero) - no meds other than Asa 81 qd  GBS neg.  EFW 3628g by sonia.  – OBHx: 2015 - ectopic @ 5wks - sp [facility]  2019 - FT  - 8#5, IVF pregnancy  – GynHx: +abnl pap years ago sp colpo, normal paps since  – PMH: Factor V Leiden (heterozygous)  – PSH: [facility] ()  – Psych: denies   – Social: denies   – Meds: PNV, Fe, ASA  – Allergies: NKDA  – Will accept blood transfusions? Yes

## 2024-08-03 NOTE — OB RN TRIAGE NOTE - NS_PARA_OBGYN_ALL_OB_NU
The resident documentation has been  personally reviewed by me in its entirety. I confirm that the note above accurately reflects all work, treatment, procedures, and medical decision that has been discussed. 1 The resident documentation has been  personally reviewed by me in its entirety. I confirm that the note above accurately reflects   all work, treatment, procedures, and medical decision that has been discussed.

## 2024-08-03 NOTE — OB PROVIDER TRIAGE NOTE - NSOBPROVIDERNOTE_OBGYN_ALL_OB_FT
Assessment  44 yo  @ 39+4 wga presents with fevers at home and URI sx. Pt with AF and tachy to 130s.     Plan  - Will plan for IV hydration - 1L bolus and then maintenance D5+LR  - Obtain CBC/CMP  - Obtain respiratory virus panel  - FHT initially tachy but now cat I, reassuring  - Will administer Tylenol (last dose @4a)  - Will continue to monitor       Patient discussed with attending physician,    Jojo Melendez MD PGY1 Assessment  42 yo  @ 39+4 wga presents with fevers at home and URI sx. Pt AF and tachy to 130s in triage. FHT initially tachy but now cat I.    Plan  - Will plan for IV hydration - 1L bolus and then maintenance D5+LR  - Obtain CBC/CMP  - Obtain respiratory virus panel  - FHT initially tachy but now cat I, reassuring  - Will administer Tylenol (last dose @4a)  - Will continue to monitor       Patient discussed with attending physician,    Jojo Melendez MD PGY1 Assessment  42 yo  @ 39+4 wga presents with fevers at home and URI sx. Pt AF and tachy to 130s in triage. FHT initially tachy but now cat I.    Plan  - Will plan for IV hydration - 1L bolus and then maintenance D5+LR  - Obtain CBC/CMP  - Obtain respiratory virus panel  - FHT initially tachy but now cat I, reassuring  - Will administer Tylenol (last dose @4a)  - Will continue to monitor       Patient discussed with attending physician, Dr. John Melendez MD PGY1    ADDENDUM 8/3 @ 12:56pm    Pt tested positive for COVID. Improved symptomatically after IV Tylenol and IV hydration. AF. No leukocytosis.  FHT cat I  BPP , MARTHA 14  Will prescribe Paxlovid - sent to pharmacy  Recommended hydration and Tylenol 975mg q6 hours PRN  Pt stable for d/c - discussed return precautions including VB, LOF, painful regular ctx, dec FM, CP/SOB, etc  Pt will contact private OB office on      D/w Dr. John Melendez PGY2

## 2024-08-03 NOTE — OB PROVIDER H&P - NSLOWPPHRISK_OBGYN_A_OB
No previous uterine incision/[place] Pregnancy/Less than or equal to 4 previous vaginal births/No known bleeding disorder

## 2024-08-03 NOTE — OB PROVIDER TRIAGE NOTE - HISTORY OF PRESENT ILLNESS
Subjective  HPI: 43yoF  @39+4wga presents with fever and URI sx.   Pt reports fevers and chills, with recorded temp of 101 @4a. Pt took Tylenol around that time. Continues to have chills.  Developed congestion and runny nose overnight as well.  Denies CP/SOB/lightheadedness/dizziness/nausea/vomiting/diarrhea.  +FM. -LOF. -CTXs. -VB. Pt denies any other concerns.    – PNC: Follows with MFM for Factor V Leiden (hetero) - no meds other than Asa 81 qd  GBS neg.  EFW 3628g by sonia.  – OBHx: 2015 - ectopic @ 5wks - sp [facility]  2019 - FT  - 8#5, IVF pregnancy  – GynHx: +abnl pap years ago sp colpo, normal paps since  – PMH: Factor V Leiden (heterozygous)  – PSH: [facility] ()  – Psych: denies   – Social: denies   – Meds: PNV, Fe, ASA  – Allergies: NKDA  – Will accept blood transfusions? Yes

## 2024-08-03 NOTE — OB PROVIDER TRIAGE NOTE - NSHPPHYSICALEXAM_GEN_ALL_CORE
Objective  – Vital Signs  BP: 110s/50s  HR: 120-130s  Temp: 37.4    – PE:   CV: RRR  Pulm: breathing comfortably on RA  Abd: gravid, nontender  Extr: moving all extremities with ease    – VE: 2/50/-3  – FHT: baseline 145bpm, mod variability, +accels, -decels  – [place]: q3-8min  – EFW: 3628g by sono  – Sono: vertex

## 2024-08-03 NOTE — OB RN PATIENT PROFILE - NS_OBGYNHISTORY_OBGYN_ALL_OB_FT
ectopic x1 with L tube removal,  x1 ectopic x1 with L tube removal,  x1, abnormal pap smear --> colposcopy

## 2024-08-03 NOTE — OB RN PATIENT PROFILE - FALL HARM RISK - UNIVERSAL INTERVENTIONS
Bed in lowest position, wheels locked, appropriate side rails in place/Call bell, personal items and telephone in reach/Instruct patient to call for assistance before getting out of bed or chair/Non-slip footwear when patient is out of bed/[place] to call system/Physically safe environment - no spills, clutter or unnecessary equipment/Purposeful Proactive Rounding/Room/bathroom lighting operational, light cord in reach

## 2024-08-03 NOTE — OB PROVIDER H&P - ATTENDING COMMENTS
September 24, 2020      Mariah [address] [place]  [address] [place] [address]        Dear ,    We are writing to inform you of your test results.    Your test results fall within the expected range(s) or remain unchanged from previous results.  Please continue with current treatment plan.    Resulted Orders   Lipid panel reflex to direct LDL Fasting   Result Value Ref Range    Cholesterol 182 <200 mg/dL    Triglycerides 129 <150 mg/dL      Comment:      Non Fasting    HDL Cholesterol 66 >49 mg/dL    LDL Cholesterol Calculated 90 <100 mg/dL      Comment:      Desirable:       <100 mg/dl    Non HDL Cholesterol 116 <130 mg/dL   Basic metabolic panel   Result Value Ref Range    Sodium 138 133 - 144 mmol/L    Potassium 4.4 3.4 - 5.3 mmol/L    Chloride 106 94 - 109 mmol/L    Carbon Dioxide 27 20 - 32 mmol/L    Anion Gap 5 3 - 14 mmol/L    Glucose 77 70 - 99 mg/dL      Comment:      Non Fasting    Urea Nitrogen 16 7 - 30 mg/dL    Creatinine 0.71 0.52 - 1.04 mg/dL    GFR Estimate 83 >60 mL/min/[1.73_m2]      Comment:      Non  GFR Calc  Starting 12/18/2018, serum creatinine based estimated GFR (eGFR) will be   calculated using the Chronic Kidney Disease Epidemiology Collaboration   (CKD-EPI) equation.      GFR Estimate If Black >90 >60 mL/min/[1.73_m2]      Comment:       GFR Calc  Starting 12/18/2018, serum creatinine based estimated GFR (eGFR) will be   calculated using the Chronic Kidney Disease Epidemiology Collaboration   (CKD-EPI) equation.      Calcium 9.2 8.5 - 10.1 mg/dL       If you have any questions or concerns, please call the clinic at the number listed above.       Sincerely,        Eli Sommer MD/[facility]                
42yo P1 @ 39 wks admitted w VB and new + Covid diagnosis  preg complicated by factor F (heterogeneous) and AMA    prior salpingectomy for ectopic preg      at 38yo and 42yo  w new reported heavy bleeding  admit for IOL  Cytotec to Pitocin as needed      A MD John  attending

## 2024-08-03 NOTE — OB PROVIDER H&P - NSHPPHYSICALEXAM_GEN_ALL_CORE
Vital Signs Last 24 Hrs  T(C): 37 (03 Aug 2024 19:42), Max: 37.8 (03 Aug 2024 09:35)  T(F): 98.6 (03 Aug 2024 19:42), Max: 100.04 (03 Aug 2024 09:35)  HR: 97 (03 Aug 2024 20:13) (97 - 133)  BP: 108/62 (03 Aug 2024 19:42) (99/50 - 118/58)  BP(mean): --  RR: 18 (03 Aug 2024 19:42) (15 - 22)  SpO2: 99% (03 Aug 2024 20:18) (93% - 100%)    Parameters below as of 03 Aug 2024 19:42  Patient On (Oxygen Delivery Method): room air      – PE:   CV: RRR  Pulm: breathing comfortably on RA  Abd: gravid, nontender  Extr: moving all extremities with ease    – VE: 2.5/60/-3  – FHT: baseline 145bpm, mod variability, +accels, -decels  – [place]: q10min  – EFW: 3628g by sono  – Sono: vertex

## 2024-08-03 NOTE — OB PROVIDER H&P - ASSESSMENT
43yoF  @39+4wga presents with vagina bleeding. Pt is GBS negative, to be admitted to L&D for IOL    Plan:  - LR Bolus x 1L, LR@125cc/hr for maintenance.  - Routine labs  - Covid +, contact precautions  - For buccal cytotec induction  - CLD  - Monitor FHT/toco.    Edda Velasco PGY2  d/w Dr. Lopez  43yoF  @39+4wga presents with vagina bleeding. Pt is GBS negative, to be admitted to L&D for IOL    Plan:  - LR Bolus x 1L, LR@125cc/hr for maintenance.  - Routine labs  - For buccal cytotec induction  - CLD  - Covid +, contact precautions  - Pt took first dose of paxlovid at home, will continue on remdesivir while inpatient  - Monitor FHT/toco.    Edda Velasco PGY2  d/w Dr. Lopez

## 2024-08-03 NOTE — PRE-ANESTHESIA EVALUATION ADULT - NSANTHPMHFT_GEN_ALL_CORE
39.4 weeks gestation; fever/URI sxs/chills; factor V leiden heterozygous on baby ASA;  ; ectopic/ laparoscopic left salpingectomy  ; s/p co;poscopy; IVF pregnancy

## 2024-08-04 DIAGNOSIS — Z98.890 OTHER SPECIFIED POSTPROCEDURAL STATES: Chronic | ICD-10-CM

## 2024-08-04 LAB
ALBUMIN SERPL ELPH-MCNC: 3.2 G/DL — LOW (ref 3.3–5)
ALP SERPL-CCNC: 195 U/L — HIGH (ref 40–120)
ALT FLD-CCNC: 21 U/L — SIGNIFICANT CHANGE UP (ref 10–45)
AST SERPL-CCNC: 27 U/L — SIGNIFICANT CHANGE UP (ref 10–40)
BILIRUB DIRECT SERPL-MCNC: <0.1 MG/DL — SIGNIFICANT CHANGE UP (ref 0–0.3)
BILIRUB INDIRECT FLD-MCNC: >0.1 MG/DL — LOW (ref 0.2–1)
BILIRUB SERPL-MCNC: 0.2 MG/DL — SIGNIFICANT CHANGE UP (ref 0.2–1.2)
CREAT SERPL-MCNC: 0.5 MG/DL — SIGNIFICANT CHANGE UP (ref 0.5–1.3)
EGFR: 118 ML/MIN/1.73M2 — SIGNIFICANT CHANGE UP
INR BLD: 1.03 RATIO — SIGNIFICANT CHANGE UP (ref 0.85–1.18)
PROT SERPL-MCNC: 5.7 G/DL — LOW (ref 6–8.3)
PROTHROM AB SERPL-ACNC: 10.8 SEC — SIGNIFICANT CHANGE UP (ref 9.5–13)

## 2024-08-04 PROCEDURE — 59400 OBSTETRICAL CARE: CPT | Mod: U9

## 2024-08-04 PROCEDURE — 88307 TISSUE EXAM BY PATHOLOGIST: CPT | Mod: 26

## 2024-08-04 RX ORDER — ACETAMINOPHEN 500 MG
975 TABLET ORAL EVERY 6 HOURS
Refills: 0 | Status: DISCONTINUED | OUTPATIENT
Start: 2024-08-04 | End: 2024-08-05

## 2024-08-04 RX ORDER — OXYTOCIN/RINGER'S LACTATE 20/1000 ML
PLASTIC BAG, INJECTION (ML) INTRAVENOUS
Qty: 30 | Refills: 0 | Status: DISCONTINUED | OUTPATIENT
Start: 2024-08-04 | End: 2024-08-04

## 2024-08-04 RX ORDER — OXYTOCIN/RINGER'S LACTATE 20/1000 ML
10 PLASTIC BAG, INJECTION (ML) INTRAVENOUS ONCE
Refills: 0 | Status: COMPLETED | OUTPATIENT
Start: 2024-08-04 | End: 2024-08-04

## 2024-08-04 RX ORDER — IBUPROFEN 200 MG
600 TABLET ORAL EVERY 6 HOURS
Refills: 0 | Status: COMPLETED | OUTPATIENT
Start: 2024-08-04 | End: 2025-07-03

## 2024-08-04 RX ORDER — CLOSTRIDIUM TETANI TOXOID ANTIGEN (FORMALDEHYDE INACTIVATED), CORYNEBACTERIUM DIPHTHERIAE TOXOID ANTIGEN (FORMALDEHYDE INACTIVATED), BORDETELLA PERTUSSIS TOXOID ANTIGEN (GLUTARALDEHYDE INACTIVATED), BORDETELLA PERTUSSIS FILAMENTOUS HEMAGGLUTININ ANTIGEN (FORMALDEHYDE INACTIVATED), BORDETELLA PERTUSSIS PERTACTIN ANTIGEN, AND BORDETELLA PERTUSSIS FIMBRIAE 2/3 ANTIGEN 5; 2; 2.5; 5; 3; 5 [LF]/.5ML; [LF]/.5ML; UG/.5ML; UG/.5ML; UG/.5ML; UG/.5ML
0.5 INJECTION, SUSPENSION INTRAMUSCULAR ONCE
Refills: 0 | Status: DISCONTINUED | OUTPATIENT
Start: 2024-08-04 | End: 2024-08-05

## 2024-08-04 RX ORDER — MAGNESIUM HYDROXIDE 400 MG/5ML
30 SUSPENSION, ORAL (FINAL DOSE FORM) ORAL
Refills: 0 | Status: DISCONTINUED | OUTPATIENT
Start: 2024-08-04 | End: 2024-08-05

## 2024-08-04 RX ORDER — DIPHENHYDRAMINE HCL 25 MG
25 CAPSULE ORAL EVERY 6 HOURS
Refills: 0 | Status: DISCONTINUED | OUTPATIENT
Start: 2024-08-04 | End: 2024-08-05

## 2024-08-04 RX ORDER — OXYCODONE HYDROCHLORIDE 30 MG/1
5 TABLET ORAL ONCE
Refills: 0 | Status: DISCONTINUED | OUTPATIENT
Start: 2024-08-04 | End: 2024-08-05

## 2024-08-04 RX ORDER — OXYCODONE HYDROCHLORIDE 30 MG/1
5 TABLET ORAL
Refills: 0 | Status: DISCONTINUED | OUTPATIENT
Start: 2024-08-04 | End: 2024-08-05

## 2024-08-04 RX ORDER — WITCH HAZEL 500 MG/1
1 CLOTH TOPICAL EVERY 4 HOURS
Refills: 0 | Status: DISCONTINUED | OUTPATIENT
Start: 2024-08-04 | End: 2024-08-05

## 2024-08-04 RX ORDER — REMDESIVIR 100 MG/1
100 INJECTION, POWDER, LYOPHILIZED, FOR SOLUTION INTRAVENOUS EVERY 24 HOURS
Refills: 0 | Status: DISCONTINUED | OUTPATIENT
Start: 2024-08-05 | End: 2024-08-05

## 2024-08-04 RX ORDER — KETOROLAC TROMETHAMINE 10 MG
30 TABLET ORAL ONCE
Refills: 0 | Status: DISCONTINUED | OUTPATIENT
Start: 2024-08-04 | End: 2024-08-04

## 2024-08-04 RX ORDER — OXYTOCIN/RINGER'S LACTATE 20/1000 ML
41.67 PLASTIC BAG, INJECTION (ML) INTRAVENOUS
Qty: 20 | Refills: 0 | Status: DISCONTINUED | OUTPATIENT
Start: 2024-08-04 | End: 2024-08-05

## 2024-08-04 RX ORDER — HYDROCORTISONE 1 %
1 CREAM (GRAM) TOPICAL EVERY 6 HOURS
Refills: 0 | Status: DISCONTINUED | OUTPATIENT
Start: 2024-08-04 | End: 2024-08-05

## 2024-08-04 RX ORDER — DIBUCAINE 1 %
1 OINTMENT (GRAM) TOPICAL EVERY 6 HOURS
Refills: 0 | Status: DISCONTINUED | OUTPATIENT
Start: 2024-08-04 | End: 2024-08-05

## 2024-08-04 RX ORDER — ACETAMINOPHEN 500 MG
975 TABLET ORAL
Refills: 0 | Status: DISCONTINUED | OUTPATIENT
Start: 2024-08-04 | End: 2024-08-05

## 2024-08-04 RX ORDER — LANOLIN 100 %
1 OINTMENT (GRAM) TOPICAL EVERY 6 HOURS
Refills: 0 | Status: DISCONTINUED | OUTPATIENT
Start: 2024-08-04 | End: 2024-08-05

## 2024-08-04 RX ORDER — CRANBERRY FRUIT EXTRACT 650 MG
1 CAPSULE ORAL DAILY
Refills: 0 | Status: DISCONTINUED | OUTPATIENT
Start: 2024-08-04 | End: 2024-08-05

## 2024-08-04 RX ORDER — BACTERIOSTATIC SODIUM CHLORIDE 0.9 %
3 VIAL (ML) INJECTION EVERY 8 HOURS
Refills: 0 | Status: DISCONTINUED | OUTPATIENT
Start: 2024-08-04 | End: 2024-08-05

## 2024-08-04 RX ORDER — SIMETHICONE 125 MG/1
80 TABLET, CHEWABLE ORAL EVERY 4 HOURS
Refills: 0 | Status: DISCONTINUED | OUTPATIENT
Start: 2024-08-04 | End: 2024-08-05

## 2024-08-04 RX ADMIN — Medication 10 UNIT(S): at 18:22

## 2024-08-04 RX ADMIN — Medication 2 MILLIUNIT(S)/MIN: at 05:39

## 2024-08-04 RX ADMIN — Medication 30 MILLIGRAM(S): at 19:27

## 2024-08-04 RX ADMIN — Medication 975 MILLIGRAM(S): at 01:09

## 2024-08-04 RX ADMIN — REMDESIVIR 200 MILLIGRAM(S): 100 INJECTION, POWDER, LYOPHILIZED, FOR SOLUTION INTRAVENOUS at 08:10

## 2024-08-04 RX ADMIN — Medication 30 MILLIGRAM(S): at 20:00

## 2024-08-04 NOTE — OB RN DELIVERY SUMMARY - BABY A: APGAR 5 MIN COLOR, DELIVERY
Care Transitions:  Received referral from the ED for inability to afford medications.  Met with the patient regarding finances.  Patient stated, his wife is in the process of communicating with [employer] for insurance.  The [facility] [employer] will provide finances for medications.   Caitlin Navas RN, Care Coordinator [phone]   (1) body pink, extremities blue

## 2024-08-04 NOTE — OB PROVIDER LABOR PROGRESS NOTE - NS_SUBJECTIVE/OBJECTIVE_OBGYN_ALL_OB_FT
Noted variable decel  pt comfortable    ICU Vital Signs Last 24 Hrs  T(C): 36.8 (04 Aug 2024 17:01), Max: 38.0 (04 Aug 2024 01:29)  T(F): 98.24 (04 Aug 2024 17:01), Max: 100.4 (04 Aug 2024 01:29)  HR: 101 (04 Aug 2024 17:40) (72 - 116)  BP: 95/51 (04 Aug 2024 16:41) (87/48 - 128/58)  RR: 18 (04 Aug 2024 06:58) (18 - 18)  SpO2: 84% (04 Aug 2024 17:40) (84% - 100%)    O2 Parameters below as of 03 Aug 2024 20:52  Patient On (Oxygen Delivery Method): room air
Pt evaluated at bedside
s/p epidural and comfortablel    ICU Vital Signs Last 24 Hrs  T(C): 36.9 (04 Aug 2024 14:55), Max: 38.0 (04 Aug 2024 01:29)  HR: 85 (04 Aug 2024 16:05) (73 - 116)  BP: 101/62 (04 Aug 2024 15:55) (98/51 - 128/58)  RR: 18 (04 Aug 2024 06:58) (18 - 18)  SpO2: 100% (04 Aug 2024 16:05) (89% - 100%)    O2 Parameters below as of 03 Aug 2024 20:52  Patient On (Oxygen Delivery Method): room air                              9.8    7.23  )-----------( 138      ( 03 Aug 2024 22:24 )             29.4     08-04    x   |  x   |  x   ----------------------------<  x   x    |  x   |  0.50    Ca    9.5      03 Aug 2024 10:27    TPro  5.7<L>  /  Alb  3.2<L>  /  TBili  0.2  /  DBili  <0.1  /  AST  27  /  ALT  21  /  AlkPhos  195<H>  08-04

## 2024-08-04 NOTE — OB PROVIDER DELIVERY SUMMARY - NSSELHIDDEN_OBGYN_ALL_OB_FT
[NS_DeliveryAttending1_OBGYN_ALL_OB_FT:[id number]==],[NS_DeliveryRN_OBGYN_ALL_OB_FT:[id number]]

## 2024-08-04 NOTE — OB PROVIDER LABOR PROGRESS NOTE - ASSESSMENT
44yo P1  IOL for VB complicated by covid +  fully dilated  second stage    Pitocin lowered to 4mUnits    adequate pelvis    EFW ~ 8 lb (clinical)      EUN Lopez MD  Attending
42yo  multip @ 39 wks w vaginal bleeding and active covid  s/p cytotec on pitocin  epidural in place  SROM just now, clear    making cervical change  adequate pelvis    Pt feeling better today    expectant management for vaginal delivery  decreased Pitocin to allow fetus to acclimate to ROM    Pt unsure if desire circ for baby    A MD John  attending
s/p Buccal cytotec  Switch to pitocin   cont. EFM/TOCO  Resuscitative measures PRN    Edda Velasco PGY2  Plan per Dr. Lopez

## 2024-08-04 NOTE — OB RN DELIVERY SUMMARY - NS_SEPSISRSKCALC_OBGYN_ALL_OB_FT
EOS calculated successfully. EOS Risk Factor: 0.5/1000 live births ([facility] national incidence); GA=39w5d; Temp=100.4; ROM=2.167; GBS='Negative'; Antibiotics='No antibiotics or any antibiotics < 2 hrs prior to birth'

## 2024-08-04 NOTE — OB RN DELIVERY SUMMARY - NSSELHIDDEN_OBGYN_ALL_OB_FT
[NS_DeliveryAttending1_OBGYN_ALL_OB_FT:[id number]==],[NS_DeliveryRN_OBGYN_ALL_OB_FT:[id number]]

## 2024-08-04 NOTE — OB PROVIDER DELIVERY SUMMARY - NSPROVIDERDELIVERYNOTE_OBGYN_ALL_OB_FT
Pushed x 15 min     of viable male  over 2nd degree laceration  no sulcal, vaginal or rectal defects    Note CULLEN atony--bimanual massage, IM and IV Pitocin    Placenta delivered intact, mild delay    laceration repaired w 2-0 Vicryl    immediate skin to skin w delayed cord clamping      EUN Lopez MD  attending

## 2024-08-04 NOTE — OB PROVIDER LABOR PROGRESS NOTE - NS_OBIHIFHRDETAILS_OBGYN_ALL_OB_FT
145, min/mod variability, + accels, early decels
130s, mod, +accel, -decel
135, moderate variability, no accel, variable and late decels

## 2024-08-05 ENCOUNTER — TRANSCRIPTION ENCOUNTER (OUTPATIENT)
Age: 44
End: 2024-08-05

## 2024-08-05 VITALS
DIASTOLIC BLOOD PRESSURE: 73 MMHG | SYSTOLIC BLOOD PRESSURE: 108 MMHG | RESPIRATION RATE: 18 BRPM | OXYGEN SATURATION: 98 % | HEART RATE: 75 BPM | TEMPERATURE: 97 F

## 2024-08-05 DIAGNOSIS — O98.513 OTHER VIRAL DISEASES COMPLICATING PREGNANCY, THIRD TRIMESTER: ICD-10-CM

## 2024-08-05 LAB
ALBUMIN SERPL ELPH-MCNC: 2.5 G/DL — LOW (ref 3.3–5)
ALP SERPL-CCNC: 156 U/L — HIGH (ref 40–120)
ALT FLD-CCNC: 24 U/L — SIGNIFICANT CHANGE UP (ref 10–45)
AST SERPL-CCNC: 34 U/L — SIGNIFICANT CHANGE UP (ref 10–40)
BILIRUB DIRECT SERPL-MCNC: <0.1 MG/DL — SIGNIFICANT CHANGE UP (ref 0–0.3)
BILIRUB INDIRECT FLD-MCNC: SIGNIFICANT CHANGE UP MG/DL (ref 0.2–1)
BILIRUB SERPL-MCNC: <0.1 MG/DL — LOW (ref 0.2–1.2)
CREAT SERPL-MCNC: 0.58 MG/DL — SIGNIFICANT CHANGE UP (ref 0.5–1.3)
EGFR: 114 ML/MIN/1.73M2 — SIGNIFICANT CHANGE UP
INR BLD: 1 RATIO — SIGNIFICANT CHANGE UP (ref 0.85–1.18)
PROT SERPL-MCNC: 4.7 G/DL — LOW (ref 6–8.3)
PROTHROM AB SERPL-ACNC: 10.5 SEC — SIGNIFICANT CHANGE UP (ref 9.5–13)
T PALLIDUM AB TITR SER: NEGATIVE — SIGNIFICANT CHANGE UP

## 2024-08-05 PROCEDURE — 86900 BLOOD TYPING SEROLOGIC ABO: CPT

## 2024-08-05 PROCEDURE — 85025 COMPLETE CBC W/AUTO DIFF WBC: CPT

## 2024-08-05 PROCEDURE — 82565 ASSAY OF CREATININE: CPT

## 2024-08-05 PROCEDURE — 80076 HEPATIC FUNCTION PANEL: CPT

## 2024-08-05 PROCEDURE — 88307 TISSUE EXAM BY PATHOLOGIST: CPT

## 2024-08-05 PROCEDURE — 36415 COLL VENOUS BLD VENIPUNCTURE: CPT

## 2024-08-05 PROCEDURE — 86850 RBC ANTIBODY SCREEN: CPT

## 2024-08-05 PROCEDURE — 59050 FETAL MONITOR W/REPORT: CPT

## 2024-08-05 PROCEDURE — 85610 PROTHROMBIN TIME: CPT

## 2024-08-05 PROCEDURE — 99222 1ST HOSP IP/OBS MODERATE 55: CPT

## 2024-08-05 PROCEDURE — 86901 BLOOD TYPING SEROLOGIC RH(D): CPT

## 2024-08-05 PROCEDURE — 86780 TREPONEMA PALLIDUM: CPT

## 2024-08-05 RX ORDER — HYDROCORTISONE 1 %
1 CREAM (GRAM) TOPICAL
Qty: 0 | Refills: 0 | DISCHARGE
Start: 2024-08-05

## 2024-08-05 RX ORDER — CRANBERRY FRUIT EXTRACT 650 MG
1 CAPSULE ORAL
Qty: 0 | Refills: 0 | DISCHARGE
Start: 2024-08-05

## 2024-08-05 RX ORDER — IBUPROFEN 200 MG
600 TABLET ORAL EVERY 6 HOURS
Refills: 0 | Status: DISCONTINUED | OUTPATIENT
Start: 2024-08-05 | End: 2024-08-05

## 2024-08-05 RX ORDER — LANOLIN 100 %
1 OINTMENT (GRAM) TOPICAL
Qty: 0 | Refills: 0 | DISCHARGE
Start: 2024-08-05

## 2024-08-05 RX ORDER — WITCH HAZEL 500 MG/1
1 CLOTH TOPICAL
Qty: 0 | Refills: 0 | DISCHARGE
Start: 2024-08-05

## 2024-08-05 RX ORDER — IBUPROFEN 200 MG
1 TABLET ORAL
Qty: 0 | Refills: 0 | DISCHARGE
Start: 2024-08-05

## 2024-08-05 RX ORDER — ACETAMINOPHEN 500 MG
3 TABLET ORAL
Qty: 0 | Refills: 0 | DISCHARGE
Start: 2024-08-05

## 2024-08-05 RX ORDER — DIBUCAINE 1 %
1 OINTMENT (GRAM) TOPICAL
Qty: 0 | Refills: 0 | DISCHARGE
Start: 2024-08-05

## 2024-08-05 RX ADMIN — REMDESIVIR 200 MILLIGRAM(S): 100 INJECTION, POWDER, LYOPHILIZED, FOR SOLUTION INTRAVENOUS at 08:30

## 2024-08-05 RX ADMIN — Medication 3 MILLILITER(S): at 04:56

## 2024-08-05 RX ADMIN — Medication 975 MILLIGRAM(S): at 11:31

## 2024-08-05 RX ADMIN — Medication 3 MILLILITER(S): at 10:10

## 2024-08-05 RX ADMIN — Medication 975 MILLIGRAM(S): at 18:09

## 2024-08-05 RX ADMIN — Medication 600 MILLIGRAM(S): at 16:00

## 2024-08-05 RX ADMIN — Medication 975 MILLIGRAM(S): at 12:15

## 2024-08-05 RX ADMIN — Medication 975 MILLIGRAM(S): at 05:23

## 2024-08-05 RX ADMIN — Medication 975 MILLIGRAM(S): at 01:00

## 2024-08-05 RX ADMIN — Medication 1 TABLET(S): at 11:31

## 2024-08-05 RX ADMIN — Medication 975 MILLIGRAM(S): at 06:00

## 2024-08-05 RX ADMIN — Medication 600 MILLIGRAM(S): at 09:15

## 2024-08-05 RX ADMIN — Medication 975 MILLIGRAM(S): at 00:28

## 2024-08-05 RX ADMIN — Medication 600 MILLIGRAM(S): at 08:31

## 2024-08-05 RX ADMIN — Medication 600 MILLIGRAM(S): at 15:16

## 2024-08-05 RX ADMIN — Medication 975 MILLIGRAM(S): at 19:00

## 2024-08-05 NOTE — DISCHARGE NOTE OB - CARE PLAN
Principal Discharge DX:	 (normal spontaneous vaginal delivery)  Assessment and plan of treatment:	postpartum care, pelvic rest  f/u Dr. Martin in 4-6 weeks   1

## 2024-08-05 NOTE — CONSULT NOTE ADULT - SUBJECTIVE AND OBJECTIVE BOX
INFECTIOUS DISEASE CONSULT NOTE    Patient is a 43y old  Female who presents with a chief complaint of Labor (05 Aug 2024 14:51)    HPI:  HPI: 43yoF  @39+4wga presents with vagina bleeding. Pt was initially presented today with fevers and chills and found to be covid +. Pt reports that she was having some brownish discharge when she went home but two hours ago she noticed some bright red bleeding on her pad and when she wiped. She denies any passage of clots and her pad was never fully saturated. She denies any leakage of fluid. Denies CP/SOB/lightheadedness/dizziness/nausea/vomiting/diarrhea. She has increased back cramping. She reports + fetal movement.    – PNC: Follows with MFM for Factor V Leiden (hetero) - no meds other than Asa 81 qd  GBS neg.  EFW 3628g by sonia.  – OBHx: 2015 - ectopic @ 5wks - sp [facility]  2019 - FT  - 8#5, IVF pregnancy  – GynHx: +abnl pap years ago sp colpo, normal paps since  – PMH: Factor V Leiden (heterozygous)  – PSH: [facility] ()  – Psych: denies   – Social: denies   – Meds: PNV, Fe, ASA  – Allergies: NKDA  – Will accept blood transfusions? Yes (03 Aug 2024 20:10)       REVIEW OF SYSTEMS:      Prior hospital charts reviewed [Yes]  Primary team notes reviewed [Yes]  Other consultant notes reviewed [Yes]    PAST MEDICAL & SURGICAL HISTORY:  Miscarriage of left tubal ectopic pregnancy       (normal spontaneous vaginal delivery)      History of abnormal Pap smear      History of colposcopy          SOCIAL HISTORY:      FAMILY HISTORY:      Allergies:  No Known Allergies      ANTIMICROBIALS:  remdesivir  IVPB 100 every 24 hours  remdesivir  IVPB        ANTIMICROBIALS (past 90 days):  MEDICATIONS  (STANDING):  remdesivir  IVPB   200 mL/Hr IV Intermittent (24 @ 08:10)    remdesivir  IVPB   200 mL/Hr IV Intermittent (24 @ 08:30)        OTHER MEDS:   MEDICATIONS  (STANDING):  acetaminophen     Tablet .. 975 every 6 hours  acetaminophen     Tablet .. 975 <User Schedule>  diphenhydrAMINE 25 every 6 hours PRN  diphtheria/tetanus/pertussis (acellular) Vaccine (Adacel) 0.5 once  ibuprofen  Tablet. 600 every 6 hours  magnesium hydroxide Suspension 30 two times a day PRN  oxyCODONE    IR 5 every 3 hours PRN  oxyCODONE    IR 5 once PRN  oxytocin Infusion 333.333 <Continuous>  oxytocin Infusion 41.667 <Continuous>  simethicone 80 every 4 hours PRN      VITALS:  Vital Signs Last 24 Hrs  T(F): 97.4 (24 @ 06:21), Max: 100.4 (24 @ 01:29)    Vital Signs Last 24 Hrs  HR: 75 (24 @ 06:21) (49 - 111)  BP: 108/73 (24 @ 06:21) (87/48 - 144/77)  RR: 18 (24 @ 06:21)  SpO2: 98% (24 @ 06:21) (75% - 100%)  Wt(kg): --    EXAM:      Labs:                        9.8    7.23  )-----------( 138      ( 03 Aug 2024 22:24 )             29.4         x   |  x   |  x   ----------------------------<  x   x    |  x   |  0.58      TPro  4.7<L>  /  Alb  2.5<L>  /  TBili  <0.1<L>  /  DBili  <0.1  /  AST  34  /  ALT  24  /  AlkPhos  156<H>        WBC Trend:  WBC Count: 7.23 (24 @ 22:24)  WBC Count: 8.94 (24 @ 10:27)      Auto Neutrophil #: 5.18 K/uL (24 @ 22:24)      Creatine Trend:  Creatinine: 0.58 ()  Creatinine: 0.50 ()  Creatinine: 0.53 ()      Liver Biochemical Testing Trend:  Alanine Aminotransferase (ALT/SGPT): 24 ()  Alanine Aminotransferase (ALT/SGPT): 21 ()  Alanine Aminotransferase (ALT/SGPT): 23 ()  Aspartate Aminotransferase (AST/SGOT): 34 (24 @ 06:30)  Aspartate Aminotransferase (AST/SGOT): 27 (24 @ 09:05)  Aspartate Aminotransferase (AST/SGOT): 29 (24 @ 10:27)  Bilirubin Direct: <0.1 (-)  Bilirubin Total: <0.1 (-)  Bilirubin Direct: <0.1 (-)  Bilirubin Total: 0.2 (-)  Bilirubin Total: 0.3 ()      Trend LDH      Auto Eosinophil %: 1.7 % (24 @ 22:24)          MICROBIOLOGY:              Treponema Pallidum Antibody Interpretation: Negative (24 @ 22:24)                                          RADIOLOGY:  imaging below personally reviewed   INFECTIOUS DISEASE CONSULT NOTE    Patient is a 43y old  Female who presents with a chief complaint of Labor (05 Aug 2024 14:51)    HPI:  HPI: 43yoF  presents at 39 weeks with vagina bleeding. Pt was initially presented today with fevers and chills and found to be Covid positive.  Pelvic exam at the time showed that she was 2 cm dilated. She was discharged on paxlovid. Later in the day she started to have some brownish discharge when she went home but two hours ago she noticed some bright red bleeding on her pad and when she wiped. She denies any passage of clots and her pad was never fully saturated. She denies any leakage of fluid. Denies CP/SOB/lightheadedness/dizziness/nausea/vomiting/diarrhea. She has increased back cramping. She reports + fetal movement.    – PNC: Follows with MFM for Factor V Leiden (hetero) - no meds other than Asa 81 qd  GBS neg.  EFW 3628g by sonia.  – OBHx: 2015 - ectopic @ 5wks - sp [facility]  2019 - FT  - 8#5, IVF pregnancy  – GynHx: +abnl pap years ago sp colpo, normal paps since  – PMH: Factor V Leiden (heterozygous)  – PSH: [facility] ()  – Psych: denies   – Social: denies   – Meds: PNV, Fe, ASA  – Allergies: NKDA  – Will accept blood transfusions? Yes (03 Aug 2024 20:10)       REVIEW OF SYSTEMS:      Prior hospital charts reviewed [Yes]  Primary team notes reviewed [Yes]  Other consultant notes reviewed [Yes]    PAST MEDICAL & SURGICAL HISTORY:  Miscarriage of left tubal ectopic pregnancy       (normal spontaneous vaginal delivery)      History of abnormal Pap smear      History of colposcopy          SOCIAL HISTORY:      FAMILY HISTORY:      Allergies:  No Known Allergies      ANTIMICROBIALS:  remdesivir  IVPB 100 every 24 hours  remdesivir  IVPB        ANTIMICROBIALS (past 90 days):  MEDICATIONS  (STANDING):  remdesivir  IVPB   200 mL/Hr IV Intermittent (24 @ 08:10)    remdesivir  IVPB   200 mL/Hr IV Intermittent (24 @ 08:30)        OTHER MEDS:   MEDICATIONS  (STANDING):  acetaminophen     Tablet .. 975 every 6 hours  acetaminophen     Tablet .. 975 <User Schedule>  diphenhydrAMINE 25 every 6 hours PRN  diphtheria/tetanus/pertussis (acellular) Vaccine (Adacel) 0.5 once  ibuprofen  Tablet. 600 every 6 hours  magnesium hydroxide Suspension 30 two times a day PRN  oxyCODONE    IR 5 every 3 hours PRN  oxyCODONE    IR 5 once PRN  oxytocin Infusion 333.333 <Continuous>  oxytocin Infusion 41.667 <Continuous>  simethicone 80 every 4 hours PRN      VITALS:  Vital Signs Last 24 Hrs  T(F): 97.4 (24 @ 06:21), Max: 100.4 (24 @ 01:29)    Vital Signs Last 24 Hrs  HR: 75 (24 @ 06:21) (49 - 111)  BP: 108/73 (24 @ 06:21) (87/48 - 144/77)  RR: 18 (24 @ 06:21)  SpO2: 98% (24 @ 06:21) (75% - 100%)  Wt(kg): --    EXAM:      Labs:                        9.8    7.23  )-----------( 138      ( 03 Aug 2024 22:24 )             29.4         x   |  x   |  x   ----------------------------<  x   x    |  x   |  0.58      TPro  4.7<L>  /  Alb  2.5<L>  /  TBili  <0.1<L>  /  DBili  <0.1  /  AST  34  /  ALT  24  /  AlkPhos  156<H>        WBC Trend:  WBC Count: 7.23 (24 @ 22:24)  WBC Count: 8.94 (24 @ 10:27)      Auto Neutrophil #: 5.18 K/uL (24 @ 22:24)      Creatine Trend:  Creatinine: 0.58 ()  Creatinine: 0.50 ()  Creatinine: 0.53 ()      Liver Biochemical Testing Trend:  Alanine Aminotransferase (ALT/SGPT): 24 ()  Alanine Aminotransferase (ALT/SGPT): 21 ()  Alanine Aminotransferase (ALT/SGPT): 23 ()  Aspartate Aminotransferase (AST/SGOT): 34 (24 @ 06:30)  Aspartate Aminotransferase (AST/SGOT): 27 (24 @ 09:05)  Aspartate Aminotransferase (AST/SGOT): 29 (24 @ 10:27)  Bilirubin Direct: <0.1 (-)  Bilirubin Total: <0.1 ()  Bilirubin Direct: <0.1 ()  Bilirubin Total: 0.2 ()  Bilirubin Total: 0.3 ()      Trend LDH      Auto Eosinophil %: 1.7 % (24 @ 22:24)          MICROBIOLOGY:              Treponema Pallidum Antibody Interpretation: Negative (24 @ 22:24)                                          RADIOLOGY:  imaging below personally reviewed   INFECTIOUS DISEASE CONSULT NOTE    Patient is a 43y old  Female who presents with a chief complaint of Labor (05 Aug 2024 14:51)    HPI:  HPI: 43 year old female   presents at 39 weeks with vagina bleeding. Pt was initially presented today with fevers and chills and found to be Covid positive.  Pelvic exam at the time showed that she was 2 cm dilated. She was discharged on paxlovid. Later in the day she started to have some brownish discharge when she went home but two hours ago she noticed some bright red bleeding on her pad and when she wiped. She reports + fetal movement. Patient was induced with oxytocin and has an  .       – PNC: Follows with MFM for Factor V Leiden (hetero) - no meds other than Asa 81 qd. GBS neg.  .  – OBHx: 2015 - ectopic @ 5wks - sp [facility]  2019 - FT  - 8#5, IVF pregnancy  – GynHx: +abnl pap years ago sp colpo, normal paps since  – PMH: Factor V Leiden (heterozygous)  – PSH: [facility] ()  – Psych: denies   – Meds: PNV, Fe, ASA  – Allergies: NKDA  – Will accept blood transfusions? Yes (03 Aug 2024 20:10)       REVIEW OF SYSTEMS:  Constitutional: No fevers, No chills,  Skin: No rash, no phlebitis	  Eyes: No discharge, No change in vision	  ENMT: No sore throat, No ulcers  Respiratory: No cough, no SOB  Cardiovascular:  No chest pain, No palpitations   Gastrointestinal: No pain, No nausea, No vomiting, No diarrhea, No constipation	  Genitourinary: No dysuria, No frequency, No hesitancy, No flank pain  MSK: No Joint pain, No back pain, No edema  Neurological: No HA, no weakness, no seizures, no AMS        Prior hospital charts reviewed [Yes]  Primary team notes reviewed [Yes]  Other consultant notes reviewed [Yes]    PAST MEDICAL & SURGICAL HISTORY:  Miscarriage of left tubal ectopic pregnancy       (normal spontaneous vaginal delivery)      History of abnormal Pap smear      History of colposcopy          SOCIAL HISTORY:  Denies smoking  Denies alcohol   Works as a PA           Allergies:  No Known Allergies      ANTIMICROBIALS:  remdesivir  IVPB 100 every 24 hours  remdesivir  IVPB        ANTIMICROBIALS (past 90 days):  MEDICATIONS  (STANDING):  remdesivir  IVPB   200 mL/Hr IV Intermittent (24 @ 08:10)    remdesivir  IVPB   200 mL/Hr IV Intermittent (24 @ 08:30)        OTHER MEDS:   MEDICATIONS  (STANDING):  acetaminophen     Tablet .. 975 every 6 hours  acetaminophen     Tablet .. 975 <User Schedule>  diphenhydrAMINE 25 every 6 hours PRN  diphtheria/tetanus/pertussis (acellular) Vaccine (Adacel) 0.5 once  ibuprofen  Tablet. 600 every 6 hours  magnesium hydroxide Suspension 30 two times a day PRN  oxyCODONE    IR 5 every 3 hours PRN  oxyCODONE    IR 5 once PRN  oxytocin Infusion 333.333 <Continuous>  oxytocin Infusion 41.667 <Continuous>  simethicone 80 every 4 hours PRN      VITALS:  Vital Signs Last 24 Hrs  T(F): 97.4 (24 @ 06:21), Max: 100.4 (24 @ 01:29)    Vital Signs Last 24 Hrs  HR: 75 (24 @ 06:21) (49 - 111)  BP: 108/73 (24 @ 06:21) (87/48 - 144/77)  RR: 18 (24 @ 06:21)  SpO2: 98% (24 @ 06:21) (75% - 100%)  Wt(kg): --    EXAM:  General: Patient appears comfortable, in no acute distress. On room air  HEENT: PERRL, anicteric sclera, mucous membranes moist   Neck: Supple, No lymphadenopathy  CV: +S1/S2, RRR, no murmurs heard  Lungs: No respiratory distress, CTA b/l  Abd:  BS4+, Soft, NTND, no guarding  : No suprapubic tenderness  Neuro: AAOx3. No focal deficits noted.   Ext: No cyanosis, no edema  Msk: freely moving upper and lower extremities  Skin: No rash, no phlebitis, No erythema      Labs:                        9.8    7.23  )-----------( 138      ( 03 Aug 2024 22:24 )             29.4     08-05          TPro  4.7<L>  /  Alb  2.5<L>  /  TBili  <0.1<L>  /  DBili  <0.1  /  AST  34  /  ALT  24  /  AlkPhos  156<H>        WBC Trend:  WBC Count: 7.23 (24 @ 22:24)  WBC Count: 8.94 (24 @ 10:27)      Auto Neutrophil #: 5.18 K/uL (24 @ 22:24)      Creatine Trend:  Creatinine: 0.58 ()  Creatinine: 0.50 ()  Creatinine: 0.53 ()      Liver Biochemical Testing Trend:  Alanine Aminotransferase (ALT/SGPT): 24 ()  Alanine Aminotransferase (ALT/SGPT): 21 ()  Alanine Aminotransferase (ALT/SGPT): 23 ()  Aspartate Aminotransferase (AST/SGOT): 34 (24 @ 06:30)  Aspartate Aminotransferase (AST/SGOT): 27 (24 @ 09:05)  Aspartate Aminotransferase (AST/SGOT): 29 (24 @ 10:27)  Bilirubin Direct: <0.1 ()  Bilirubin Total: <0.1 ()  Bilirubin Direct: <0.1 ()  Bilirubin Total: 0.2 ()  Bilirubin Total: 0.3 ()      Trend LDH      Auto Eosinophil %: 1.7 % (24 @ 22:24)          MICROBIOLOGY:              Treponema Pallidum Antibody Interpretation: Negative (24 @ 22:24)                                RADIOLOGY:  No imaging done this admission

## 2024-08-05 NOTE — DISCHARGE NOTE OB - NS MD DC FALL RISK RISK
For information on Fall & Injury Prevention, visit: [email or web address] OR  [email or web address] OR  https://www.cdc.gov/steadi/patient.html

## 2024-08-05 NOTE — PROGRESS NOTE ADULT - SUBJECTIVE AND OBJECTIVE BOX
PPD#1- ATTENDING NOTE  YANI BURTON  MRN-[id number]  43y    Patient is a 43y old  Female s/p  PPD#1, +COVID diagnosed 2 days ago on remdesivir duirng admission, was on plaxovid prior to admission.   patinet denies fevers, no SOB, no chest pain, + breastfeeding        S: Patient doing well. Minimal lochia. Pain controlled.    O: Vital Signs Last 24 Hrs  T(C): 36.3 (05 Aug 2024 06:21), Max: 37.3 (04 Aug 2024 18:42)  T(F): 97.4 (05 Aug 2024 06:21), Max: 99.1 (04 Aug 2024 18:42)  HR: 75 (05 Aug 2024 06:21) (49 - 111)  BP: 108/73 (05 Aug 2024 06:21) (87/48 - 144/77)  BP(mean): --  RR: 18 (05 Aug 2024 06:21) (16 - 18)  SpO2: 98% (05 Aug 2024 06:21) (75% - 100%)    Parameters below as of 05 Aug 2024 06:21  Patient On (Oxygen Delivery Method): room air        Gen: NAD  Abd: soft, NT, ND, fundus firm below umbilicus  Lochia: moderate  Ext: no tenderness, no c/c/e b/l     Labs:                        9.8    7.23  )-----------( 138      ( 03 Aug 2024 22:24 )             29.4       A/P Patient is a 43y old  Female s/p  PPD#1, +COVID diagnosed 2 days ago on remdesivir duirng admission, was on plaxovid prior to admission.  Patient stable   Patient denies fevers, no SOB, no chest pain, + breastfeeding    Patient considering discharge home today, asked about po remdesivir, discussed likely not avilable in po form, will check with the current recommendations. I asked Dr. Daigle of [facility] and he recommended consulting ID.  Analgesia prn, Motrin and Tylenol  Regular diet  Consider discharge home pending recommendations by ID.  Ingris Trevizo MD  Office Number [phone] 

## 2024-08-05 NOTE — DISCHARGE NOTE OB - MEDICATION SUMMARY - MEDICATIONS TO TAKE
I will START or STAY ON the medications listed below when I get home from the hospital:    ibuprofen 600 mg oral tablet  -- 1 tab(s) by mouth every 6 hours  -- Indication: For pain control    acetaminophen 325 mg oral tablet  -- 3 tab(s) by mouth every 6 hours  -- Indication: For pain control    Paxlovid 300 mg-100 mg Dose Pack oral tablet  -- 1 by mouth 2 times a day  -- Indication: For COVID    benzocaine 20% topical spray  -- 1 Apply on skin to affected area every 6 hours As needed for Perineal discomfort  -- Indication: For perineal discomfort    dibucaine 1% topical ointment  -- 1 Apply on skin to affected area every 6 hours As needed Perineal discomfort  -- Indication: For perineal discomfort    pramoxine 1% topical cream  -- 1 Apply on skin to affected area every 4 hours As needed Moderate Pain (4-6)  -- Indication: For pain    hydrocortisone 1% topical cream  -- 1 Apply on skin to affected area every 6 hours As needed Moderate Pain (4-6)  -- Indication: For pain    lanolin topical ointment  -- 1 Apply on skin to affected area every 6 hours As needed nipple soreness  -- Indication: For Nipple soreness    witch hazel 50% topical pad  -- 1 Apply on skin to affected area every 4 hours As needed Perineal discomfort  -- Indication: For perineal discomfort    Prenatal Multivitamins with Folic Acid 1 mg oral tablet  -- 1 tab(s) by mouth once a day  -- Indication: For  (normal spontaneous vaginal delivery)

## 2024-08-05 NOTE — PROGRESS NOTE ADULT - ASSESSMENT
A/P: 43yoF P2 PPD#1 s/p . Patient is stable and doing well post-partum.   - Pain well controlled, continue current pain regimen  - Increase ambulation, SCDs when not ambulating  - Continue regular diet  - Stool softeners ordered prn   - Routine post-partum care     Amanda Edwards PA-C A/P: 43yoF P2 PPD#1 s/p . Patient is stable and doing well post-partum. Pt tested positive for Covid prior to delivery denies any coughing, fevers, night sweats or chills. Will continue contact precautions.    - Pain well controlled, continue current pain regimen  - Increase ambulation, SCDs when not ambulating  - Continue regular diet  - Stool softeners ordered prn   - Covid+ contact precautions   - Routine post-partum care     Amanda Edwards PA-C

## 2024-08-05 NOTE — PROGRESS NOTE ADULT - SUBJECTIVE AND OBJECTIVE BOX
OB PA Progress Note:  PPD#1      43yoF P2 PPD#1 s/p . Patient seen ambulating well around in her room, she feels well. Pain is well controlled. She is tolerating a regular diet and passing flatus, denies having bowel movement. She is voiding freely without difficulty. Denies CP/SOB. Denies lightheadedness/dizziness. Denies N/V.      Vital Signs Last 24 Hrs  T(C): 36.3 (05 Aug 2024 06:21), Max: 37.3 (04 Aug 2024 18:42)  T(F): 97.4 (05 Aug 2024 06:21), Max: 99.1 (04 Aug 2024 18:42)  HR: 75 (05 Aug 2024 06:21) (49 - 111)  BP: 108/73 (05 Aug 2024 06:21) (87/48 - 144/77)  RR: 18 (05 Aug 2024 06:21) (16 - 18)  SpO2: 98% (05 Aug 2024 06:21) (75% - 100%)      Blood type: A Positive  Rubella IgG: Immune   RPR: pending                           9.8<L>   7.23 >-----------< 138<L>    (  @ 22:24 )             29.4<L>                        11.2<L>   8.94 >-----------< 157    (  @ 10:27 )             34.5    24 @ 06:30      General: NAD  Abdomen: soft, non-tender, non-distended, fundus firm and below the umbilicus   Vaginal: Lochia wnl  Extremities: No calf tenderness or edema b/l        OB PA Progress Note:  PPD#1    43yoF P2 PPD#1 s/p . Patient seen ambulating well around in her room, she feels well. Pain is well controlled. She is tolerating a regular diet and passing flatus, denies having bowel movement. She is voiding freely without difficulty. Denies CP/SOB. Denies lightheadedness/dizziness. Denies N/V. Pt tested positive for Covid prior to delivery denies any coughing, fevers, night sweats or chills. Will continue contact precautions.        Vital Signs Last 24 Hrs  T(C): 36.3 (05 Aug 2024 06:21), Max: 37.3 (04 Aug 2024 18:42)  T(F): 97.4 (05 Aug 2024 06:21), Max: 99.1 (04 Aug 2024 18:42)  HR: 75 (05 Aug 2024 06:21) (49 - 111)  BP: 108/73 (05 Aug 2024 06:21) (87/48 - 144/77)  RR: 18 (05 Aug 2024 06:21) (16 - 18)  SpO2: 98% (05 Aug 2024 06:21) (75% - 100%)      Blood type: A Positive  Rubella IgG: Immune   RPR: pending                           9.8<L>   7.23 >-----------< 138<L>    (  @ 22:24 )             29.4<L>                        11.2<L>   8.94 >-----------< 157    (  @ 10:27 )             34.5    24 @ 06:30      General: NAD  Abdomen: soft, non-tender, non-distended, fundus firm and below the umbilicus   Vaginal: Lochia wnl  Extremities: No calf tenderness or edema b/l

## 2024-08-05 NOTE — DISCHARGE NOTE OB - CARE PROVIDER_API CALL
Michelle Martin  Obstetrics and Gynecology  [address], [address]  [place] [address]  Phone: [phone]  Fax: [phone]  Follow Up Time:

## 2024-08-05 NOTE — DISCHARGE NOTE OB - PATIENT PORTAL LINK FT
You can access the FollowMyHealth Patient Portal offered by [facility] by registering at the following website: [email or web address] By joining [facility]’s FollowMyHealth portal, you will also be able to view your health information using other applications (apps) compatible with our system.

## 2024-08-05 NOTE — DISCHARGE NOTE OB - INCREASED VAGINAL BLEEDING OR LARGE CLOTS (SATURATING A PAD AN HOUR)
Telephone Encounter by Deepika Shannon at 03/15/18 04:17 PM     Author:  Deepika Shannon Service:  (none) Author Type:       Filed:  03/15/18 04:19 PM Encounter Date:  3/15/2018 Status:  Signed     :  Deepika Shannon ()            Received call from patient son calling in regards to the authorization for patient and therapy, advised that authorization for PT is handled by the PT department, caller was transferred to PT department .[[email or web address]]      Revision History        User Key Date/Time User Provider Type Action    > CC1.1 03/15/18 04:19 PM Deepika Shannon  Sign    M - Manual             Statement Selected

## 2024-08-05 NOTE — CONSULT NOTE ADULT - ASSESSMENT
Impression/Hospital Course: 43 year old female  presents at 39 weeks due to fever, chills and myalgia. She was found to be COVID+ and discharged home on paxlovid. She took her paxlovid dose for one day and  then returned due to vaginal bleeding and had . She is breastfeeding. Patient with O2 saturation of 99% on room air.       Antimicrobials:  - Remdesivir (Day 2)      Assessment:  - COVID+ with mild illness (initially with fever, myalgias, chills) with risk factors (i.e pregnancy)    Recommendations:  - Can discharge patient on  Paxlovid (300/100) BID for two days to complete 5 day course    Case discussed with attending      Alejandro Whalen DO, PGY-4  Infectious Disease Fellow  [employer] Teams Preferred  After 5pm/weekends call [phone]

## 2024-08-05 NOTE — CONSULT NOTE ADULT - ATTENDING COMMENTS
Patient seen and examined. Chart with pertinent labs and imaging reviewed.  Doing well  Given some degree of immune suppression with pregnancy with increased morbidity and mortality, (which presumably continues immediately postpartum for a bit), reasonable to complete fixed brief course of anti-covid Rx with Paxlovid. The drug is recommended in pregnancy, but there is limited data of the nirmatrelvir  in breast-feeding (the ritonavir component has a long safety record/clinical experience). I do not think keeping the patient in hospital for additional remdesivir has any clinical advantage here.   D/W patient and her  (both PA's)  D/W nursing/nursing management. Apparently all OB housestaff were occupied in the OR at the time of assessment and not able to be reached.   Card given to nursing management for them to reach out with issues.  No ID objection to outpatient management  I'll sign off at this time.   Thank you for the courtesy of this referral.    Helder Funez MD  Attending Physician  [facility]  Division of Infectous Diseases  [phone]

## 2024-08-05 NOTE — DISCHARGE NOTE OB - HOSPITAL COURSE
Admitted for labor and normal spontaneous vaginal delivery.  of viable male, second degree laceration, CULLEN atony managed by bimannual massage, IM and IV pitocin. Placenta delivered intact. No complications. Patient had an uncomplicated  followed by an uncomplicated postpartum course. . hct 34.5->29.4. On postpartum day 2, patient was discharged home in stable condition, voiding spontaneously and with normal vital signs.

## 2024-08-08 DIAGNOSIS — O26.893 OTHER SPECIFIED PREGNANCY RELATED CONDITIONS, THIRD TRIMESTER: ICD-10-CM

## 2024-08-08 DIAGNOSIS — R00.0 TACHYCARDIA, UNSPECIFIED: ICD-10-CM

## 2024-08-08 DIAGNOSIS — O09.13 SUPERVISION OF PREGNANCY WITH HISTORY OF ECTOPIC PREGNANCY, THIRD TRIMESTER: ICD-10-CM

## 2024-08-08 DIAGNOSIS — O36.8330 MATERNAL CARE FOR ABNORMALITIES OF THE FETAL HEART RATE OR RHYTHM, THIRD TRIMESTER, NOT APPLICABLE OR UNSPECIFIED: ICD-10-CM

## 2024-08-08 DIAGNOSIS — O98.513 OTHER VIRAL DISEASES COMPLICATING PREGNANCY, THIRD TRIMESTER: ICD-10-CM

## 2024-08-08 DIAGNOSIS — D68.51 ACTIVATED PROTEIN C RESISTANCE: ICD-10-CM

## 2024-08-08 DIAGNOSIS — Z3A.39 39 WEEKS GESTATION OF PREGNANCY: ICD-10-CM

## 2024-08-08 DIAGNOSIS — O09.523 SUPERVISION OF ELDERLY MULTIGRAVIDA, THIRD TRIMESTER: ICD-10-CM

## 2024-08-08 DIAGNOSIS — U07.1 COVID-19: ICD-10-CM

## 2024-08-08 DIAGNOSIS — O99.113 OTHER DISEASES OF THE BLOOD AND BLOOD-FORMING ORGANS AND CERTAIN DISORDERS INVOLVING THE IMMUNE MECHANISM COMPLICATING PREGNANCY, THIRD TRIMESTER: ICD-10-CM

## 2024-08-17 LAB — SURGICAL PATHOLOGY STUDY: SIGNIFICANT CHANGE UP

## 2024-08-21 ENCOUNTER — TRANSCRIPTION ENCOUNTER (OUTPATIENT)
Age: 44
End: 2024-08-21

## 2024-12-26 NOTE — OB RN TRIAGE NOTE - TEMPERATURE IN CELSIUS (DEGREES C)
Patient called to request a refill for their Farxiga. He was advised a refill was requested on 12/26/24 and is pending approval. Patient verbalized understanding and is in agreement. He has one tablet left. Rerouting with high priority.   37.4

## 2025-02-20 NOTE — OB PROVIDER DELIVERY SUMMARY - MLM HIDDEN
REFERRAL APPROVAL NOTICE         Sent on February 20, 2025                   Siena Rodriguez  [address] [place] [address]                   Dear Ms. Rodriguez,    After a careful review of the medical information and benefit coverage, [facility] has processed your referral. See below for additional details.    If applicable, you must be actively enrolled with your insurance for coverage of the authorized service. If you have any questions regarding your coverage, please contact your insurance directly.    REFERRAL INFORMATION   Referral #:  [id number]  Referred-To Department    Referred-By Provider:  Dermatology    Sergey Oreilly M.D.   [facility]      [address]  [address]  [place] [address]  [phone] [address]  [place] [id number] [phone]    Referral Start Date:  02/19/2025  Referral End Date:   02/19/2026             SCHEDULING  If you do not already have an appointment, please call [phone] to make an appointment.     MORE INFORMATION  If you do not already have a [employer] account, sign up at: [email or web address]  You can access your medical information, make appointments, see lab results, billing information, and more.  If you have questions regarding this referral, please contact  the [facility] Referrals department at:             [phone]. Monday - Friday 8:00AM - 5:00PM.     Sincerely,    [facility]    
yes